# Patient Record
Sex: FEMALE | Race: WHITE | NOT HISPANIC OR LATINO | URBAN - METROPOLITAN AREA
[De-identification: names, ages, dates, MRNs, and addresses within clinical notes are randomized per-mention and may not be internally consistent; named-entity substitution may affect disease eponyms.]

---

## 2021-05-03 ENCOUNTER — OFFICE VISIT CONVERTED (OUTPATIENT)
Dept: ORTHOPEDIC SURGERY | Facility: CLINIC | Age: 46
End: 2021-05-03
Attending: STUDENT IN AN ORGANIZED HEALTH CARE EDUCATION/TRAINING PROGRAM

## 2021-05-14 VITALS — WEIGHT: 192.31 LBS | OXYGEN SATURATION: 97 % | HEART RATE: 98 BPM | HEIGHT: 67 IN | BODY MASS INDEX: 30.18 KG/M2

## 2023-06-16 PROBLEM — L03.90 CELLULITIS: Status: ACTIVE | Noted: 2021-06-02

## 2023-06-16 PROBLEM — R89.5 ABNORMAL MICROBIOLOGICAL FINDINGS IN SPECIMENS FROM OTHER ORGANS, SYSTEMS AND TISSUES: Status: ACTIVE | Noted: 2021-06-02

## 2023-06-16 PROBLEM — E55.9 VITAMIN D DEFICIENCY: Status: ACTIVE | Noted: 2023-06-16

## 2023-06-16 PROBLEM — F17.200 CURRENT EVERY DAY SMOKER: Status: ACTIVE | Noted: 2023-06-16

## 2023-06-16 PROBLEM — G40.909 SEIZURE DISORDER: Status: ACTIVE | Noted: 2023-06-16

## 2023-06-16 PROBLEM — L02.419 ABSCESS OF HIP: Status: ACTIVE | Noted: 2020-10-11

## 2023-06-16 PROBLEM — F32.A ANXIETY AND DEPRESSION: Status: ACTIVE | Noted: 2023-06-16

## 2023-06-16 PROBLEM — F41.9 ANXIETY AND DEPRESSION: Status: ACTIVE | Noted: 2023-06-16

## 2023-06-16 PROBLEM — E11.9 DIABETES MELLITUS: Status: ACTIVE | Noted: 2021-06-02

## 2023-06-16 PROBLEM — IMO0002 ULCERATIVE LESION: Status: ACTIVE | Noted: 2023-06-16

## 2023-06-19 ENCOUNTER — TELEPHONE (OUTPATIENT)
Dept: INTERNAL MEDICINE | Facility: CLINIC | Age: 48
End: 2023-06-19
Payer: MEDICAID

## 2023-07-11 PROBLEM — I10 ESSENTIAL HYPERTENSION: Status: ACTIVE | Noted: 2023-07-11

## 2023-07-11 PROBLEM — E53.8 B12 DEFICIENCY: Status: ACTIVE | Noted: 2023-07-11

## 2023-08-03 ENCOUNTER — TELEPHONE (OUTPATIENT)
Dept: OBSTETRICS AND GYNECOLOGY | Facility: CLINIC | Age: 48
End: 2023-08-03
Payer: MEDICAID

## 2023-08-18 ENCOUNTER — TELEPHONE (OUTPATIENT)
Dept: OBSTETRICS AND GYNECOLOGY | Facility: CLINIC | Age: 48
End: 2023-08-18
Payer: MEDICAID

## 2024-02-05 ENCOUNTER — OFFICE VISIT (OUTPATIENT)
Dept: INTERNAL MEDICINE | Facility: CLINIC | Age: 49
End: 2024-02-05
Payer: MEDICAID

## 2024-02-05 VITALS
WEIGHT: 196 LBS | OXYGEN SATURATION: 98 % | HEIGHT: 67 IN | DIASTOLIC BLOOD PRESSURE: 90 MMHG | TEMPERATURE: 97.3 F | SYSTOLIC BLOOD PRESSURE: 150 MMHG | BODY MASS INDEX: 30.76 KG/M2 | HEART RATE: 109 BPM

## 2024-02-05 DIAGNOSIS — E55.9 VITAMIN D DEFICIENCY: ICD-10-CM

## 2024-02-05 DIAGNOSIS — E08.65 DIABETES MELLITUS DUE TO UNDERLYING CONDITION WITH HYPERGLYCEMIA, UNSPECIFIED WHETHER LONG TERM INSULIN USE: Chronic | ICD-10-CM

## 2024-02-05 DIAGNOSIS — E53.8 FOLATE DEFICIENCY: ICD-10-CM

## 2024-02-05 DIAGNOSIS — E78.5 HYPERLIPIDEMIA, UNSPECIFIED HYPERLIPIDEMIA TYPE: ICD-10-CM

## 2024-02-05 DIAGNOSIS — F17.200 CURRENT EVERY DAY SMOKER: ICD-10-CM

## 2024-02-05 DIAGNOSIS — I10 ESSENTIAL HYPERTENSION: Chronic | ICD-10-CM

## 2024-02-05 DIAGNOSIS — E53.8 B12 DEFICIENCY: ICD-10-CM

## 2024-02-05 DIAGNOSIS — G62.9 PERIPHERAL POLYNEUROPATHY: ICD-10-CM

## 2024-02-05 DIAGNOSIS — F32.A ANXIETY AND DEPRESSION: ICD-10-CM

## 2024-02-05 DIAGNOSIS — Z12.11 ENCOUNTER FOR SCREENING FOR MALIGNANT NEOPLASM OF COLON: ICD-10-CM

## 2024-02-05 DIAGNOSIS — Z00.00 ANNUAL PHYSICAL EXAM: Primary | ICD-10-CM

## 2024-02-05 DIAGNOSIS — F41.9 ANXIETY AND DEPRESSION: ICD-10-CM

## 2024-02-05 DIAGNOSIS — Z23 NEED FOR VACCINATION FOR STREP PNEUMONIAE: ICD-10-CM

## 2024-02-05 PROCEDURE — 99396 PREV VISIT EST AGE 40-64: CPT | Performed by: NURSE PRACTITIONER

## 2024-02-05 PROCEDURE — 90677 PCV20 VACCINE IM: CPT | Performed by: NURSE PRACTITIONER

## 2024-02-05 PROCEDURE — 1160F RVW MEDS BY RX/DR IN RCRD: CPT | Performed by: NURSE PRACTITIONER

## 2024-02-05 PROCEDURE — 3077F SYST BP >= 140 MM HG: CPT | Performed by: NURSE PRACTITIONER

## 2024-02-05 PROCEDURE — 3080F DIAST BP >= 90 MM HG: CPT | Performed by: NURSE PRACTITIONER

## 2024-02-05 PROCEDURE — 1159F MED LIST DOCD IN RCRD: CPT | Performed by: NURSE PRACTITIONER

## 2024-02-05 PROCEDURE — 90471 IMMUNIZATION ADMIN: CPT | Performed by: NURSE PRACTITIONER

## 2024-02-05 RX ORDER — FLUOXETINE HYDROCHLORIDE 40 MG/1
40 CAPSULE ORAL DAILY
Qty: 90 CAPSULE | Refills: 1 | Status: SHIPPED | OUTPATIENT
Start: 2024-02-05

## 2024-02-05 RX ORDER — ROSUVASTATIN CALCIUM 5 MG/1
5 TABLET, COATED ORAL DAILY
Qty: 90 TABLET | Refills: 1 | Status: SHIPPED | OUTPATIENT
Start: 2024-02-05

## 2024-02-05 RX ORDER — PEN NEEDLE, DIABETIC 30 GX3/16"
1 NEEDLE, DISPOSABLE MISCELLANEOUS DAILY
Qty: 100 EACH | Refills: 11 | Status: SHIPPED | OUTPATIENT
Start: 2024-02-05 | End: 2024-05-05

## 2024-02-05 RX ORDER — BACLOFEN 20 MG/1
20 TABLET ORAL 3 TIMES DAILY
Qty: 90 TABLET | Refills: 0 | Status: SHIPPED | OUTPATIENT
Start: 2024-02-05

## 2024-02-05 NOTE — PROGRESS NOTES
Answers submitted by the patient for this visit:  Primary Reason for Visit (Submitted on 2/4/2024)  What is the primary reason for your visit?: Diabetes  Diabetes Questionnaire (Submitted on 2/4/2024)  Chief Complaint: Diabetes problem  Diabetes type: type 2  MedicAlert ID: No  Disease duration: 11 Years  Treatment compliance: some of the time  Home blood tests: 1-2 x per week  High score: 180-200  Below 70: never  blurred vision: Yes  foot paresthesias: Yes  foot ulcerations: No  polydipsia: Yes  polyuria: No  weight loss: No  blackouts: No  hospitalization: No  nocturnal hypoglycemia: No  required assistance: No  required glucagon: No  confusion: No  headaches: No  speech difficulty: No  sweats: No  tremors: No  Meal planning: avoidance of concentrated sweets  Exercise: never  Eye exam current: No  Sees podiatrist: No  Additional information: Needing to try another medicine to get A1C down.  Chief Complaint  Diabetes (3 month follow up. Labs done. The patient states that she was taking ozempic but stopped due to vomiting. The patients sugar and A1c are up. Would liek to discuss pain management. )  Subjective    History of Present Illness  Sole Anaya is a 48 y.o. female who presents to [facility] for Her annual physical exam and follow-up diabetes, hypertension, anxiety, depression, folate deficiency, vitamin B-12 deficiency and vitamin D deficiency. She could not tolerate Ozempic do to vomiting. Blood sugars have [id number]. She needs  total right hip replacement but must have blood sugar controled and stop smoking.  Patient is also complaining of peripheral neuropathy in all 4 extremities.  She states she has been to pain management in the past and used gabapentin before.  She has pain she management in [facility] and was told that she could not get gabapentin if she is taking THC.  She is also requesting muscle relaxer refill that she uses for her hip.      Current  Outpatient Medications:     albuterol sulfate  (90 Base) MCG/ACT inhaler, Inhale 2 puffs 2 (Two) Times a Day. Use with space chamber., Disp: 18 g, Rfl: 5    baclofen (LIORESAL) 20 MG tablet, Take 1 tablet by mouth 3 (Three) Times a Day., Disp: 90 tablet, Rfl: 0    Fluticasone-Salmeterol (ADVAIR/WIXELA) 250-50 MCG/ACT DISKUS, Inhale 1 puff 2 (Two) Times a Day., Disp: 60 each, Rfl: 5    folic acid (FOLVITE) 1 MG tablet, Take 1 tablet by mouth Daily., Disp: 90 tablet, Rfl: 3    losartan (Cozaar) 25 MG tablet, Take 1 tablet by mouth Daily., Disp: 90 tablet, Rfl: 3    metFORMIN (FORTAMET) 1000 MG (OSM) 24 hr tablet, Take 1 tablet by mouth 2 (Two) Times a Day With Meals., Disp: 180 tablet, Rfl: 3    ondansetron ODT (ZOFRAN-ODT) 4 MG disintegrating tablet, Place 1 tablet on the tongue Every 8 (Eight) Hours As Needed for Nausea or Vomiting., Disp: 30 tablet, Rfl: 5    Spacer/Aero-Holding Chambers (Compact Space Chamber) device, 1 applicator Daily., Disp: 1 each, Rfl: 1    vitamin D3 125 MCG (5000 UT) capsule capsule, Take  by mouth., Disp: , Rfl:     FLUoxetine (PROzac) 40 MG capsule, Take 1 capsule by mouth Daily., Disp: 90 capsule, Rfl: 1    Insulin Glargine (LANTUS SOLOSTAR) 100 UNIT/ML injection pen, Inject 10 Units under the skin into the appropriate area as directed Every Night., Disp: 3 mL, Rfl: 5    Insulin Pen Needle (Pen Needles) 31G X 5 MM misc, Use 1 each Daily for 90 days., Disp: 100 each, Rfl: 11    rosuvastatin (Crestor) 5 MG tablet, Take 1 tablet by mouth Daily., Disp: 90 tablet, Rfl: 1  Allergies   Allergen Reactions    Latex Other (See Comments)    Adhesive Tape Itching and Rash      Past Medical History:   Diagnosis Date    Anxiety     B12 deficiency 07/11/2023    Depression     Diabetes mellitus     Essential hypertension 07/11/2023    Hypertension     PTSD (post-traumatic stress disorder)     Vitamin D deficiency 06/16/2023      Past Surgical History:   Procedure Laterality Date    HIP SURGERY    "   HIP SURGERY Left     HYSTERECTOMY      2013        Objective   /90 (BP Location: Right arm, Patient Position: Sitting, Cuff Size: Adult)   Pulse 109   Temp 97.3 °F (36.3 °C) (Temporal)   Ht 170.2 cm (67\")   Wt 88.9 kg (196 lb)   SpO2 98%   BMI 30.70 kg/m²    Estimated body mass index is 30.7 kg/m² as calculated from the following:    Height as of this encounter: 170.2 cm (67\").    Weight as of this encounter: 88.9 kg (196 lb).     Physical Exam  Vitals reviewed.   Constitutional:       General: She is not in acute distress.  HENT:      Head: Normocephalic and atraumatic.      Right Ear: Tympanic membrane and ear canal normal.      Left Ear: Tympanic membrane and ear canal normal.   Eyes:      Conjunctiva/sclera: Conjunctivae normal.   Cardiovascular:      Rate and Rhythm: Normal rate and regular rhythm.      Heart sounds: Normal heart sounds. No murmur heard.  Pulmonary:      Effort: Pulmonary effort is normal.      Breath sounds: Normal breath sounds. No wheezing, rhonchi or rales.   Abdominal:      General: There is no distension.      Palpations: Abdomen is soft. There is no mass.      Tenderness: There is no abdominal tenderness.   Musculoskeletal:      Right lower leg: No edema.      Left lower leg: No edema.   Lymphadenopathy:      Cervical: No cervical adenopathy.   Skin:     General: Skin is warm and dry.      Coloration: Skin is not jaundiced or pale.   Neurological:      General: No focal deficit present.      Mental Status: She is alert.   Psychiatric:         Mood and Affect: Mood normal.         Thought Content: Thought content normal.          Result Review :                  Assessment and Plan   Diagnoses and all orders for this visit:    1. Annual physical exam (Primary)    2. Diabetes mellitus due to underlying condition with hyperglycemia, unspecified whether long term insulin use  -     Hemoglobin A1c; Future  -     Ambulatory Referral to Podiatry    3. Essential hypertension  -    "  Comprehensive Metabolic Panel; Future  -     CBC & Differential; Future  -     T4, Free; Future  -     TSH; Future    4. Anxiety and depression    5. Hyperlipidemia, unspecified hyperlipidemia type  -     Lipid Panel; Future    6. Folate deficiency    7. B12 deficiency    8. Vitamin D deficiency    9. Current every day smoker    10. Peripheral polyneuropathy  -     Ambulatory Referral to Pain Management    11. Need for vaccination for Strep pneumoniae  -     Pneumococcal Conjugate Vaccine 20-Valent All    12. Encounter for screening for malignant neoplasm of colon  -     Ambulatory Referral For Screening Colonoscopy    Other orders  -     baclofen (LIORESAL) 20 MG tablet; Take 1 tablet by mouth 3 (Three) Times a Day.  Dispense: 90 tablet; Refill: 0  -     rosuvastatin (Crestor) 5 MG tablet; Take 1 tablet by mouth Daily.  Dispense: 90 tablet; Refill: 1  -     FLUoxetine (PROzac) 40 MG capsule; Take 1 capsule by mouth Daily.  Dispense: 90 capsule; Refill: 1  -     Insulin Glargine (LANTUS SOLOSTAR) 100 UNIT/ML injection pen; Inject 10 Units under the skin into the appropriate area as directed Every Night.  Dispense: 3 mL; Refill: 5  -     Insulin Pen Needle (Pen Needles) 31G X 5 MM misc; Use 1 each Daily for 90 days.  Dispense: 100 each; Refill: 11      Annual exam: Discussed healthy diet, exercise, adequate sleep, cancer screening, immunizations and preventative care. Annual eye exam and twice yearly dental cleaning.     Diabetes: Uncontrolled.  HA1C 10.4.  Continue Metformin 1000 mg twice a day. Start Lantus insulin 10 units daily and check FBS daily.      Hypertension: Blood pressure controlled on losartan 25 mg daily.     Anxiety and depression: Improving on prozac 20 mg daily. Increase to 40 mg daily.    Hyperlipidemia: . Start Crestor 5 mg daily. Recheck in 3 months.      Folate deficiency: Normal. Monitor.     B-12 deficiency:  Normal. Monitor.     Vitamin D deficiency: Level normal. Monitor.       Current smoker: Advised of ill effects of smoking on health. She is trying to quit.     Peripheral neuropathy: The patient has been to pain management in the past and used gabapentin.  Referral placed.    Patient was given instructions and counseling regarding her condition or for health maintenance advice. Please see specific information pulled into the AVS if appropriate.     Follow Up   Return in about 3 months (around 5/5/2024) for Recheck.    Dictated Utilizing Dragon Dictation.  Please note that portions of this note were completed with a voice recognition program.  Part of this note may be an electronic transcription/translation of spoken language to printed text using the Dragon Dictation System.    Afia Fletcher, MADHURI

## 2024-02-13 ENCOUNTER — TELEPHONE (OUTPATIENT)
Dept: INTERNAL MEDICINE | Facility: CLINIC | Age: 49
End: 2024-02-13
Payer: MEDICAID

## 2024-02-13 DIAGNOSIS — G40.909 UNCLASSIFIED EPILEPTIC SEIZURES: Primary | ICD-10-CM

## 2024-02-13 DIAGNOSIS — G40.909 SEIZURE DISORDER: ICD-10-CM

## 2024-03-11 ENCOUNTER — OFFICE VISIT (OUTPATIENT)
Dept: PODIATRY | Facility: CLINIC | Age: 49
End: 2024-03-11
Payer: MEDICAID

## 2024-03-11 VITALS
SYSTOLIC BLOOD PRESSURE: 135 MMHG | BODY MASS INDEX: 30.76 KG/M2 | HEIGHT: 67 IN | HEART RATE: 109 BPM | WEIGHT: 196 LBS | OXYGEN SATURATION: 96 % | DIASTOLIC BLOOD PRESSURE: 78 MMHG | TEMPERATURE: 97.3 F

## 2024-03-11 DIAGNOSIS — Z79.4 TYPE 2 DIABETES MELLITUS WITH DIABETIC POLYNEUROPATHY, WITH LONG-TERM CURRENT USE OF INSULIN: Primary | ICD-10-CM

## 2024-03-11 DIAGNOSIS — E11.8 DM FEET: ICD-10-CM

## 2024-03-11 DIAGNOSIS — E11.42 TYPE 2 DIABETES MELLITUS WITH DIABETIC POLYNEUROPATHY, WITH LONG-TERM CURRENT USE OF INSULIN: Primary | ICD-10-CM

## 2024-03-11 DIAGNOSIS — G62.9 NEUROPATHY: ICD-10-CM

## 2024-03-11 PROCEDURE — 99243 OFF/OP CNSLTJ NEW/EST LOW 30: CPT | Performed by: PODIATRIST

## 2024-03-11 PROCEDURE — 1160F RVW MEDS BY RX/DR IN RCRD: CPT | Performed by: PODIATRIST

## 2024-03-11 PROCEDURE — G8404 LOW EXTEMITY NEUR EXAM DOCUM: HCPCS | Performed by: PODIATRIST

## 2024-03-11 PROCEDURE — 3078F DIAST BP <80 MM HG: CPT | Performed by: PODIATRIST

## 2024-03-11 PROCEDURE — 1159F MED LIST DOCD IN RCRD: CPT | Performed by: PODIATRIST

## 2024-03-11 PROCEDURE — 3075F SYST BP GE 130 - 139MM HG: CPT | Performed by: PODIATRIST

## 2024-03-11 NOTE — LETTER
March 11, 2024       No Recipients    Patient: Sole Anaya   YOB: 1975   Date of Visit: 3/11/2024       Dear MADHURI Caal:    Thank you for referring Sole Anaya to me for evaluation. Below are the relevant portions of my assessment and plan of care.    Encounter Diagnosis and Orders:  Diagnoses and all orders for this visit:    1. Type 2 diabetes mellitus with diabetic polyneuropathy, with long-term current use of insulin (Primary)    2. Neuropathy    3. DM feet        If you have questions, please do not hesitate to call me. I look forward to following Sole along with you.         Sincerely,        Pankaj Jackman DPM        CC:   No Recipients

## 2024-03-11 NOTE — PROGRESS NOTES
[facility] - PODIATRY    Today's Date: 03/11/24    Patient Name: Sole Anaya  MRN: [id number]  CSN: [id number]  PCP: Afia Fletcher APRN, Last PCP Visit: 5 February 2024  Referring Provider: Afia Fletcher APRN    SUBJECTIVE     Chief Complaint   Patient presents with    Left Foot - Establish Care, Annual Exam, Diabetes    Right Foot - Establish Care, Annual Exam, Diabetes     HPI: Sole Anaya, a 48 y.o.female, presents to clinic for a diabetic foot evaluation.    New, Established, New Problem: New    Onset: Insidious    Nature: IDDM with neuropathy    Patient controlling diabetes via:  insulin injections    Patient states there last blood glucose was:  152    Patient denies any fevers, chills, nausea, vomiting, shortness of breath, nor any other constitutional signs nor symptoms.    No other pedal complaints at this time.    Patient states she is try to get her blood sugars and hemoglobin A1c is improved to get her right hip fixed.  Patient states she is decreasing her smoking intake and hopefully will be stopped soon.    Past Medical History:   Diagnosis Date    Anxiety     B12 deficiency 07/11/2023    Depression     Diabetes mellitus     Essential hypertension 07/11/2023    Hypertension     PTSD (post-traumatic stress disorder)     Vitamin D deficiency 06/16/2023     Past Surgical History:   Procedure Laterality Date    HIP SURGERY      HIP SURGERY Left     HYSTERECTOMY      2013     History reviewed. No pertinent family history.  Social History     Socioeconomic History    Marital status:    Tobacco Use    Smoking status: Every Day     Current packs/day: 0.25     Types: Cigarettes    Smokeless tobacco: Current   Vaping Use    Vaping status: Never Used   Substance and Sexual Activity    Alcohol use: Never    Drug use: Yes     Types: Marijuana    Sexual activity: Defer     Allergies   Allergen Reactions    Latex Other (See Comments)    Adhesive Tape Itching and Rash     Current  Outpatient Medications   Medication Sig Dispense Refill    albuterol sulfate  (90 Base) MCG/ACT inhaler Inhale 2 puffs 2 (Two) Times a Day. Use with space chamber. 18 g 5    baclofen (LIORESAL) 20 MG tablet Take 1 tablet by mouth 3 (Three) Times a Day. 90 tablet 0    FLUoxetine (PROzac) 40 MG capsule Take 1 capsule by mouth Daily. 90 capsule 1    Fluticasone-Salmeterol (ADVAIR/WIXELA) 250-50 MCG/ACT DISKUS Inhale 1 puff 2 (Two) Times a Day. 60 each 5    folic acid (FOLVITE) 1 MG tablet Take 1 tablet by mouth Daily. 90 tablet 3    Insulin Glargine (LANTUS SOLOSTAR) 100 UNIT/ML injection pen Inject 10 Units under the skin into the appropriate area as directed Every Night. 3 mL 5    Insulin Pen Needle (Pen Needles) 31G X 5 MM misc Use 1 each Daily for 90 days. 100 each 11    losartan (Cozaar) 25 MG tablet Take 1 tablet by mouth Daily. 90 tablet 3    metFORMIN (FORTAMET) 1000 MG (OSM) 24 hr tablet Take 1 tablet by mouth 2 (Two) Times a Day With Meals. 180 tablet 3    ondansetron ODT (ZOFRAN-ODT) 4 MG disintegrating tablet Place 1 tablet on the tongue Every 8 (Eight) Hours As Needed for Nausea or Vomiting. 30 tablet 5    rosuvastatin (Crestor) 5 MG tablet Take 1 tablet by mouth Daily. 90 tablet 1    Spacer/Aero-Holding Chambers (Compact Space Chamber) device 1 applicator Daily. 1 each 1    vitamin D3 125 MCG (5000 UT) capsule capsule Take  by mouth.       No current facility-administered medications for this visit.     Review of Systems   Constitutional: Negative.    Neurological:  Positive for numbness.   All other systems reviewed and are negative.      OBJECTIVE     Vitals:    03/11/24 0802   BP: 135/78   Pulse: 109   Temp: 97.3 °F (36.3 °C)   SpO2: 96%       Body mass index is 30.7 kg/m².    Lab Results   Component Value Date    HGBA1C 9.8 (H) 10/12/2020       Lab Results   Component Value Date    CALCIUM 10.0 08/30/2021     08/30/2021    K 3.8 08/30/2021    CO2 25 08/30/2021     08/30/2021    BUN  10 08/30/2021    CREATININE 0.80 08/30/2021    EGFRIFAFRI >60 08/30/2021    BCR 12.5 08/30/2021    ANIONGAP 15 (H) 08/30/2021       Patient seen in no apparent distress.      PHYSICAL EXAM:     Foot/Ankle Exam    GENERAL  Diabetic foot exam performed    Appearance:  appears stated age  Orientation:  AAOx3  Affect:  appropriate  Gait:  unimpaired  Assistance:  independent  Right shoe gear: casual shoe  Left shoe gear: casual shoe    VASCULAR     Right Foot Vascularity   Dorsalis pedis:  1+  Posterior tibial:  1+  Skin temperature:  warm  Edema grading:  None  CFT:  < 3 seconds  Pedal hair growth:  Present  Varicosities:  mild varicosities     Left Foot Vascularity   Dorsalis pedis:  1+  Posterior tibial:  1+  Skin temperature:  warm  Edema grading:  None  CFT:  < 3 seconds  Pedal hair growth:  Absent  Varicosities:  mild varicosities     NEUROLOGIC     Right Foot Neurologic   Light touch sensation: absent  Vibratory sensation: absent  Hot/Cold sensation: absent  Protective Sensation using [place] Monofilament:   Sites tested: 10     Left Foot Neurologic   Light touch sensation: absent  Vibratory sensation: absent  Hot/Cold sensation:  absent  Protective Sensation using [place] Monofilament:   Sites tested: 10    MUSCLE STRENGTH     Right Foot Muscle Strength   Foot dorsiflexion:  4-  Foot plantar flexion:  4-  Foot inversion:  4-  Foot eversion:  4-     Left Foot Muscle Strength   Foot dorsiflexion:  4-  Foot plantar flexion:  4-  Foot inversion:  4-  Foot eversion:  4-    RANGE OF MOTION     Right Foot Range of Motion   Foot and ankle ROM within normal limits       Left Foot Range of Motion   Foot and ankle ROM within normal limits      DERMATOLOGIC      Right Foot Dermatologic   Skin  Right foot skin is intact.      Left Foot Dermatologic   Skin  Left foot skin is intact.     Diabetic Foot Exam Performed and Monofilament Test Performed    ASSESSMENT/PLAN     Diagnoses and all orders for this  visit:    1. Type 2 diabetes mellitus with diabetic polyneuropathy, with long-term current use of insulin (Primary)    2. Neuropathy    3. DM feet        Comprehensive lower extremity examination and evaluation was performed.    Discussed findings and treatment plan including risks, benefits, and treatment options with patient in detail. Patient agreed with treatment plan.    Medications and allergies reviewed.  Reviewed available blood glucose and HgB A1C lab values along with other pertinent labs.  These were discussed with the patient as to their importance of diabetic maintenance.    Advised smoking cessation and discussed techniques to quit (patch, pill, etc), (3 - 10 minutes).  I encouraged the patient to discuss steps to begin process with their primary care provider.    Diabetic foot exam performed and documented this date, compliant with CQM required standards. Detail of findings as noted in physical exam.  Lower extremity Neurologic exam for diabetic patient performed and documented this date, compliant with PQRS required standards. Detail of findings as noted in physical exam.  Advised patient importance of good routine lower extremity hygiene. Advised patient importance of evaluating for intact skin and pain free nail borders.  Advised patient to use mirror to evaluate plantar/ soles of feet for better visualization. Advised patient monitor and phone office to be seen if any cracking to skin, open lesions, painful nail borders or if nails become elongated prior to next visit. Advised patient importance of daily cleansing of lower extremities, followed by good skin cream to maintain normal hydration of skin. Also advised patient importance of close daily monitoring of blood sugar. Advised to regulate diet and medications to maintain control of blood sugar in optimal range. Contact primary care provider if difficulties maintaining blood sugar levels.  Advised Patient of presence of Diabetes Mellitus  condition.  Advised Patient risk of progression and worsening or improvement, then return of condition.  Will monitor condition for any change in future. Treat with most appropriate treatment pending status of condition.  Counseled and advised patient extensively on nature and ramifications of diabetes. Standard instructions given to patient for good diabetic foot care and maintenance. Advised importance of careful monitoring to avoid break down and complications secondary to diabetes. Advised patient importance of strict maintenance of blood sugar control. Advised patient of possible ominous results from neglect of condition, i.e.: amputation/ loss of digits, feet and legs, or even death.  Patient states understands counseling, will monitor closely, continue good hygiene and routine diabetic foot care. Patient will contact office is questions or problems.      An After Visit Summary was printed and given to the patient at discharge, including (if requested) any available informative/educational handouts regarding diagnosis, treatment, or medications. All questions were answered to patient/family satisfaction. Should symptoms fail to improve or worsen they agree to call or return to clinic or to go to the Emergency Department. Discussed the importance of following up with any needed screening tests/labs/specialist appointments and any requested follow-up recommended by me today. Importance of maintaining follow-up discussed and patient accepts that missed appointments can delay diagnosis and potentially lead to worsening of conditions.    Return in about 1 year (around 3/11/2025) for DFE., or sooner if acute issues arise.    This document has been electronically signed by Pankaj Jackman DPM on March 11, 2024 08:43 EDT

## 2024-03-12 ENCOUNTER — PATIENT ROUNDING (BHMG ONLY) (OUTPATIENT)
Dept: PODIATRY | Facility: CLINIC | Age: 49
End: 2024-03-12
Payer: MEDICAID

## 2024-03-12 NOTE — PROGRESS NOTES
A [employer] message has been sent to the patient for PATIENT ROUNDING with [facility]

## 2024-03-29 ENCOUNTER — TELEPHONE (OUTPATIENT)
Dept: INTERNAL MEDICINE | Age: 49
End: 2024-03-29
Payer: MEDICAID

## 2024-03-29 NOTE — TELEPHONE ENCOUNTER
Pharmacy Name:  [facility]    Pharmacy representative name: ZEINAB    Pharmacy representative phone number: [phone]     What medication are you calling in regards to: SELENA    What question does the pharmacy have: THEY NEED AN UPDATED PRESCRIPTION WITH THE PATIENT'S NEW DOSAGE    Who is the provider that prescribed the medication: VISH HUDSON    Additional notes: PHARMACIST STATES THAT THE PATIENT TOLD THEM THAT SHE IS TAKING MUCH MORE THAN 10 UNITS PER NIGHT AND IS ALREADY RUNNING OUT OF MEDICATION. PHARMACIST STATES THAT THEY HAVE THE DOSAGE AS 10 UNITS PER NIGHT SO IT IS TOO SOON TO FILL THE CURRENT DOSAGE THROUGH THE PATIENT'S INSURANCE.

## 2024-03-29 NOTE — TELEPHONE ENCOUNTER
"Tried calling patient, unable to leave voicemail. At patients last visit, it is documented she is taking 10 units of lantus daily. If she is taking \"way more\" then she weill need to come in for an appointment to discuss options with Afia.      HUB MAY RELAY MESSAGE.   "

## 2024-03-29 NOTE — TELEPHONE ENCOUNTER
"    Caller: Sole Anaya \"Salma\"    Relationship: Self    Best call back number: [phone]    Requested Prescriptions:   Requested Prescriptions     Pending Prescriptions Disp Refills    Insulin Glargine (LANTUS SOLOSTAR) 100 UNIT/ML injection pen 3 mL 5     Sig: Inject 10 Units under the skin into the appropriate area as directed Every Night.        Pharmacy where request should be sent: [address] [phone] [place] [phone]      Last office visit with prescribing clinician: 2/5/2024   Last telemedicine visit with prescribing clinician: Visit date not found   Next office visit with prescribing clinician: 5/6/2024     Additional details provided by patient: OUT OF MEDICATION     Does the patient have less than a 3 day supply:  [x] Yes  [] No    Would you like a call back once the refill request has been completed: [x] Yes [] No    If the office needs to give you a call back, can they leave a voicemail: [x] Yes [] No    Joan Evans Rep   03/29/24 10:24 EDT           "

## 2024-04-05 ENCOUNTER — TELEPHONE (OUTPATIENT)
Dept: INTERNAL MEDICINE | Age: 49
End: 2024-04-05

## 2024-05-05 PROBLEM — E53.8 FOLATE DEFICIENCY: Status: ACTIVE | Noted: 2024-05-05

## 2024-05-05 PROBLEM — E78.5 HYPERLIPIDEMIA: Status: ACTIVE | Noted: 2024-05-05

## 2024-05-06 ENCOUNTER — OFFICE VISIT (OUTPATIENT)
Dept: INTERNAL MEDICINE | Age: 49
End: 2024-05-06
Payer: MEDICAID

## 2024-05-06 VITALS
SYSTOLIC BLOOD PRESSURE: 139 MMHG | DIASTOLIC BLOOD PRESSURE: 80 MMHG | HEART RATE: 105 BPM | TEMPERATURE: 96.4 F | HEIGHT: 67 IN | WEIGHT: 202.2 LBS | OXYGEN SATURATION: 95 % | BODY MASS INDEX: 31.74 KG/M2

## 2024-05-06 DIAGNOSIS — L98.9 SKIN LESIONS: ICD-10-CM

## 2024-05-06 DIAGNOSIS — I10 ESSENTIAL HYPERTENSION: ICD-10-CM

## 2024-05-06 DIAGNOSIS — E08.65 DIABETES MELLITUS DUE TO UNDERLYING CONDITION WITH HYPERGLYCEMIA, UNSPECIFIED WHETHER LONG TERM INSULIN USE: Chronic | ICD-10-CM

## 2024-05-06 DIAGNOSIS — E55.9 VITAMIN D DEFICIENCY: ICD-10-CM

## 2024-05-06 DIAGNOSIS — E53.8 FOLATE DEFICIENCY: ICD-10-CM

## 2024-05-06 DIAGNOSIS — F41.9 ANXIETY AND DEPRESSION: ICD-10-CM

## 2024-05-06 DIAGNOSIS — E78.5 HYPERLIPIDEMIA, UNSPECIFIED HYPERLIPIDEMIA TYPE: ICD-10-CM

## 2024-05-06 DIAGNOSIS — E10.65 UNCONTROLLED TYPE 1 DIABETES MELLITUS WITH HYPERGLYCEMIA: Primary | ICD-10-CM

## 2024-05-06 DIAGNOSIS — F32.A ANXIETY AND DEPRESSION: ICD-10-CM

## 2024-05-06 DIAGNOSIS — R53.83 FATIGUE, UNSPECIFIED TYPE: ICD-10-CM

## 2024-05-06 DIAGNOSIS — Z11.59 NEED FOR HEPATITIS C SCREENING TEST: ICD-10-CM

## 2024-05-06 DIAGNOSIS — E53.8 B12 DEFICIENCY: ICD-10-CM

## 2024-05-06 PROCEDURE — 1160F RVW MEDS BY RX/DR IN RCRD: CPT | Performed by: NURSE PRACTITIONER

## 2024-05-06 PROCEDURE — 1159F MED LIST DOCD IN RCRD: CPT | Performed by: NURSE PRACTITIONER

## 2024-05-06 PROCEDURE — 99214 OFFICE O/P EST MOD 30 MIN: CPT | Performed by: NURSE PRACTITIONER

## 2024-05-06 PROCEDURE — 3079F DIAST BP 80-89 MM HG: CPT | Performed by: NURSE PRACTITIONER

## 2024-05-06 PROCEDURE — 3075F SYST BP GE 130 - 139MM HG: CPT | Performed by: NURSE PRACTITIONER

## 2024-05-06 RX ORDER — NICOTINE 21 MG/24HR
1 PATCH, TRANSDERMAL 24 HOURS TRANSDERMAL EVERY 24 HOURS
Qty: 14 PATCH | Refills: 0 | Status: SHIPPED | OUTPATIENT
Start: 2024-05-06 | End: 2024-05-20

## 2024-05-06 RX ORDER — DESVENLAFAXINE SUCCINATE 50 MG/1
50 TABLET, EXTENDED RELEASE ORAL DAILY
Qty: 90 TABLET | Refills: 0 | Status: SHIPPED | OUTPATIENT
Start: 2024-05-06

## 2024-05-10 ENCOUNTER — PATIENT ROUNDING (BHMG ONLY) (OUTPATIENT)
Dept: DIABETES SERVICES | Facility: CLINIC | Age: 49
End: 2024-05-10

## 2024-05-10 ENCOUNTER — OFFICE VISIT (OUTPATIENT)
Dept: DIABETES SERVICES | Facility: CLINIC | Age: 49
End: 2024-05-10
Payer: MEDICAID

## 2024-05-10 VITALS
WEIGHT: 204 LBS | OXYGEN SATURATION: 98 % | HEIGHT: 67 IN | SYSTOLIC BLOOD PRESSURE: 128 MMHG | DIASTOLIC BLOOD PRESSURE: 83 MMHG | HEART RATE: 116 BPM | BODY MASS INDEX: 32.02 KG/M2

## 2024-05-10 DIAGNOSIS — E11.40 TYPE 2 DIABETES MELLITUS WITH DIABETIC NEUROPATHY, WITH LONG-TERM CURRENT USE OF INSULIN: ICD-10-CM

## 2024-05-10 DIAGNOSIS — E66.9 OBESITY (BMI 30-39.9): ICD-10-CM

## 2024-05-10 DIAGNOSIS — E11.65 UNCONTROLLED TYPE 2 DIABETES MELLITUS WITH HYPERGLYCEMIA: Primary | ICD-10-CM

## 2024-05-10 DIAGNOSIS — Z79.4 TYPE 2 DIABETES MELLITUS WITH DIABETIC NEUROPATHY, WITH LONG-TERM CURRENT USE OF INSULIN: ICD-10-CM

## 2024-05-10 PROCEDURE — 3079F DIAST BP 80-89 MM HG: CPT | Performed by: NURSE PRACTITIONER

## 2024-05-10 PROCEDURE — 1159F MED LIST DOCD IN RCRD: CPT | Performed by: NURSE PRACTITIONER

## 2024-05-10 PROCEDURE — 99204 OFFICE O/P NEW MOD 45 MIN: CPT | Performed by: NURSE PRACTITIONER

## 2024-05-10 PROCEDURE — 1160F RVW MEDS BY RX/DR IN RCRD: CPT | Performed by: NURSE PRACTITIONER

## 2024-05-10 PROCEDURE — 3074F SYST BP LT 130 MM HG: CPT | Performed by: NURSE PRACTITIONER

## 2024-05-10 RX ORDER — ACYCLOVIR 400 MG/1
1 TABLET ORAL ONCE
Qty: 1 EACH | Refills: 0 | Status: SHIPPED | OUTPATIENT
Start: 2024-05-10 | End: 2024-05-10

## 2024-05-10 RX ORDER — PEN NEEDLE, DIABETIC 30 GX3/16"
1 NEEDLE, DISPOSABLE MISCELLANEOUS
Qty: 450 EACH | Refills: 1 | Status: SHIPPED | OUTPATIENT
Start: 2024-05-10 | End: 2024-11-06

## 2024-05-10 RX ORDER — ACYCLOVIR 400 MG/1
1 TABLET ORAL
Qty: 3 EACH | Refills: 5 | Status: SHIPPED | OUTPATIENT
Start: 2024-05-10

## 2024-05-10 RX ORDER — INSULIN LISPRO 100 [IU]/ML
20 INJECTION, SOLUTION INTRAVENOUS; SUBCUTANEOUS
Qty: 54 ML | Refills: 1 | Status: SHIPPED | OUTPATIENT
Start: 2024-05-10 | End: 2024-11-06

## 2024-05-10 RX ORDER — MULTIVIT WITH MINERALS/LUTEIN
250 TABLET ORAL DAILY
COMMUNITY

## 2024-05-10 NOTE — PATIENT INSTRUCTIONS
Change your Lantus to taking 25 units twice a day approximately 12 hours apart    Continue taking your metformin as previously prescribed    Begin taking Humalog insulin as follows before each meal    5 units for “small meal” (30 - 45 grams of carbohydrate)        (may take ½ dose if eating less than 30 grams)  7 units for “medium meal” (45 - 60 grams of carbohydrate)  9 units for “large meal” (60 - 90 grams of carbohydrate)     Add to the meal dose using the following scale when needed:    If blood glucose is less than 150 mg/dl - 0 units  If blood glucose is between 151 and 175 - 1 units  If blood glucose is between 176 and 200 - 2 units  If blood glucose is between 201 and 225 - 3 units  If blood glucose is between 226 and 250 - 4 units  If blood glucose is between 251 and 275 - 5 units  If blood glucose is between 276 and 300 - 6 units  If blood glucose is between 301 and 325 - 7 units  If blood glucose is between 326 and 350 - 8 units  If blood glucose is 351 or above - 9 units

## 2024-05-14 ENCOUNTER — TELEPHONE (OUTPATIENT)
Dept: INTERNAL MEDICINE | Age: 49
End: 2024-05-14

## 2024-06-06 RX ORDER — BACLOFEN 20 MG/1
20 TABLET ORAL 3 TIMES DAILY
Qty: 90 TABLET | Refills: 0 | Status: SHIPPED | OUTPATIENT
Start: 2024-06-06

## 2024-06-12 ENCOUNTER — TELEPHONE (OUTPATIENT)
Dept: INTERNAL MEDICINE | Age: 49
End: 2024-06-12

## 2024-06-12 NOTE — TELEPHONE ENCOUNTER
Caller: ARIZONA FROM [facility]    Relationship: Other    Best call back number: [phone]     What form or medical record are you requesting: MOST RECENT VISIT CHART NOTES    Who is requesting this form or medical record from you: [facility] - CURRENT REFERRAL    How would you like to receive the form or medical records (pick-up, mail, fax): FAX  If fax, what is the fax number: [phone]    Timeframe paperwork needed: AS SOON AS POSSIBLE.    Additional notes: CALLER STATES THAT THEY HAVE NOT RECEIVED FAXED DOCUMENTS FROM 06.04.2024. CALLER WOULD LIKE TO HAVE DOCUMENTS FAXED AGAIN. PLEASE ADVISE.

## 2024-06-24 ENCOUNTER — TELEPHONE (OUTPATIENT)
Dept: DIABETES SERVICES | Facility: HOSPITAL | Age: 49
End: 2024-06-24
Payer: MEDICAID

## 2024-06-24 NOTE — TELEPHONE ENCOUNTER
Patient called to get scheduled with Diabetes Educator for pump start, she was wondering if she needed insulin prescribed to her for the pump before the appointment on 7/17.

## 2024-06-30 NOTE — PROGRESS NOTES
Chief Complaint  Diabetes (Follow up, med mgt, A1c eval, cgm eval, started on dexcom, will start pump later this month )    Referred By: MADHURI Caal presents to [facility] for diabetes medication management    History of Present Illness    Visit type:  follow-up  Diabetes type:  Type 2  Current diabetes status/concerns/issues:  An insulin pump was ordered for the patient but it has not yet been started.  The patient is scheduled in the upcoming month.  Other health concerns:  She continues to have a lot of right hip pain  Current Diabetes symptoms:    Polyuria: No   Polydipsia: Yes     Polyphagia: No   Blurred vision: No   Excessive fatigue: Yes she stays tired; sleeps a lot  Known Diabetes complications:  Neuropathy: Tingling, Pain, Burning, and Shooting Pain; Location: Feet and Hands  Renal: None  Eyes: No current eye exam available in record; Location: N/A; Last Eye Exam:  2 years ago ; Location: [facility] first  Amputation/Wounds:  She has prior history of a wound to the right foot which is completely healed as well and has extensive wound to the posterior left thigh which is now healed; no current wounds  GI: Indigestion and Nausea  Cardiovascular: Hypertension and Hyperlipidemia  ED: N/A  Other: None  Hypoglycemia:  None reported at this time  Hypoglycemia Symptoms:  No hypoglycemia at this time  Current diabetes treatment:  Lantus 25 units twice a day, metformin 1000 mg twice a day.  She was started on Humalog taking 5, 7, or 9 units before meals based on small, medium, or large amounts of carbohydrates plus correction for glucose levels greater than 150 using a 1-25 correction ratio at the last appointment.  She is using around 6-7 units with most meals.  Blood glucose device:  Dexcom CGM  Blood glucose monitoring frequency:  Continuous per CGM  Blood glucose range/average:  The 14-day sensor report shows an  average glucose of 271 mg/dL, with 4% in target range ( mgdL), 37% in the high range (181-250 mg/dL), 59% in the very high range (>250 mg/dL), 0% in the low range (54-70 mg/dL) and 0% in the very low range (<54 mg/dL).   Glucose Source: Device Reviewed  Diet:   She is cutting back on the sugar in her Jaquan-Aid.  Activity/Exercise:   Limited due to her hip issues    Past Medical History:   Diagnosis Date    Anxiety     B12 deficiency 07/11/2023    Depression     Diabetes mellitus     Essential hypertension 07/11/2023    Folate deficiency 05/05/2024    Hyperlipidemia 05/05/2024    PTSD (post-traumatic stress disorder)     Vitamin D deficiency 06/16/2023     Past Surgical History:   Procedure Laterality Date    HIP SURGERY      HIP SURGERY Left     HYSTERECTOMY      2013     Family History   Problem Relation Age of Onset    Diabetes Paternal Aunt     Diabetes Cousin     Diabetes Maternal Aunt      Social History     Socioeconomic History    Marital status:    Tobacco Use    Smoking status: Every Day     Current packs/day: 0.25     Types: Cigarettes    Smokeless tobacco: Current   Vaping Use    Vaping status: Never Used   Substance and Sexual Activity    Alcohol use: Never    Drug use: Yes     Types: Marijuana    Sexual activity: Defer     Allergies   Allergen Reactions    Latex Other (See Comments)    Adhesive Tape Itching and Rash       Current Outpatient Medications:     albuterol sulfate  (90 Base) MCG/ACT inhaler, Inhale 2 puffs 2 (Two) Times a Day. Use with space chamber., Disp: 18 g, Rfl: 5    baclofen (LIORESAL) 20 MG tablet, Take 1 tablet by mouth 3 (Three) Times a Day., Disp: 90 tablet, Rfl: 0    BLACK COHOSH PO, Take  by mouth., Disp: , Rfl:     Continuous Glucose Sensor (Dexcom G7 Sensor) misc, Use 1 each Every 10 (Ten) Days., Disp: 3 each, Rfl: 5    desvenlafaxine (Pristiq) 50 MG 24 hr tablet, Take 1 tablet by mouth Daily., Disp: 90 tablet, Rfl: 0    Fluticasone-Salmeterol (ADVAIR/WIXELA)  "250-50 MCG/ACT DISKUS, Inhale 1 puff 2 (Two) Times a Day., Disp: 60 each, Rfl: 5    folic acid (FOLVITE) 1 MG tablet, Take 1 tablet by mouth Daily., Disp: 90 tablet, Rfl: 3    Insulin Glargine (LANTUS SOLOSTAR) 100 UNIT/ML injection pen, Inject 35 Units under the skin into the appropriate area as directed Every Night for 180 days. Take 25 units of Lantus twice a day approximately 12 hours apart; increase the dose as directed to a maximum dose of 35 units twice a day, Disp: 32 mL, Rfl: 1    Insulin Lispro, 1 Unit Dial, (HumaLOG KwikPen) 100 UNIT/ML solution pen-injector, Inject 20 Units under the skin into the appropriate area as directed 3 (Three) Times a Day Before Meals for 180 days. Adjust dose as directed based on meal size and glucose level up to 20 units 3 times a day, Disp: 54 mL, Rfl: 1    Insulin Pen Needle (Pen Needles) 32G X 4 MM misc, Use 1 each 5 (Five) Times a Day for 180 days., Disp: 450 each, Rfl: 1    losartan (Cozaar) 25 MG tablet, Take 1 tablet by mouth Daily., Disp: 90 tablet, Rfl: 3    metFORMIN (FORTAMET) 1000 MG (OSM) 24 hr tablet, Take 1 tablet by mouth 2 (Two) Times a Day With Meals., Disp: 180 tablet, Rfl: 3    nicotine (NICODERM CQ) 7 MG/24HR patch, Place 1 patch on the skin as directed by provider Daily., Disp: 30 patch, Rfl: 1    ondansetron ODT (ZOFRAN-ODT) 4 MG disintegrating tablet, Place 1 tablet on the tongue Every 8 (Eight) Hours As Needed for Nausea or Vomiting., Disp: 30 tablet, Rfl: 5    rosuvastatin (Crestor) 5 MG tablet, Take 1 tablet by mouth Daily., Disp: 90 tablet, Rfl: 1    Spacer/Aero-Holding Chambers (Compact Space Chamber) device, 1 applicator Daily., Disp: 1 each, Rfl: 1    vitamin C (ASCORBIC ACID) 250 MG tablet, Take 1 tablet by mouth Daily., Disp: , Rfl:     vitamin D3 125 MCG (5000 UT) capsule capsule, Take  by mouth., Disp: , Rfl:     Objective     Vitals:    07/01/24 1305   BP: 170/81   Pulse: 114   SpO2: 96%   Weight: 93.4 kg (206 lb)   Height: 170.2 cm (67\") "   PainSc: 10-Worst pain ever     Body mass index is 32.26 kg/m².    Physical Exam  Constitutional:       Appearance: Normal appearance. She is obese.   HENT:      Head: Normocephalic and atraumatic.      Right Ear: External ear normal.      Left Ear: External ear normal.      Nose: Nose normal.   Eyes:      Extraocular Movements: Extraocular movements intact.      Conjunctiva/sclera: Conjunctivae normal.   Pulmonary:      Effort: Pulmonary effort is normal.   Musculoskeletal:         General: Normal range of motion.      Cervical back: Normal range of motion.   Skin:     General: Skin is warm and dry.   Neurological:      General: No focal deficit present.      Mental Status: She is alert and oriented to person, place, and time. Mental status is at baseline.   Psychiatric:         Mood and Affect: Mood normal.         Behavior: Behavior normal.         Thought Content: Thought content normal.         Judgment: Judgment normal.             Result Review :   The following data was reviewed by: MADHURI Mansfield on 07/01/2024:    Most Recent A1C          7/1/2024    13:11   HGBA1C Most Recent   Hemoglobin A1C 10.5        A1C Last 3 Results          7/1/2024    13:11   HGBA1C Last 3 Results   Hemoglobin A1C 10.5      A1c collected in the office today is 10.5%, indicating Uncontrolled Type II diabetes.  This result is up from the prior result of 10% collected on 5/8/2024    Glucose   Date Value Ref Range Status   07/01/2024 292 (H) 70 - 99 mg/dL Final     Comment:     Serial Number: [id number]:  [id number]   10/19/2020 223 (H) 74 - 99 mg/dL Final     Point of care glucose in the office today is  elevated at 292 mg/dL              Assessment: The patient has had no improvement in her A1c since the prior evaluation.  She has started on her continuous glucose sensor but has yet to be started on the insulin pump.  She is scheduled for that later this month.  In discussion with the patient it was determined that  she has not been using the carb dose plus correction when taking her mealtime insulin.  She did not understand that she was to combine the 2 doses and has only been using the sliding scale.      Diagnoses and all orders for this visit:    1. Uncontrolled type 2 diabetes mellitus with hyperglycemia (Primary)  -     POC Glycosylated Hemoglobin (Hb A1C)    2. Type 2 diabetes mellitus with diabetic neuropathy, with long-term current use of insulin    3. Uses self-applied continuous glucose monitoring device    4. Obesity (BMI 30-39.9)    Other orders  -     POC Glucose        Plan: We will have the patient increase his Lantus to taking 30 units twice a day.  She was advised if her glucose levels remain consistently above 200 after 1 week then increase to 35 twice a day and remain on this dose until her pump started.  She is to focus on taking both the mealtime insulin and the correction dose at each meal.  No other changes will be made since the patient will be starting on her insulin pump very soon.    The patient will monitor her blood glucose levels using the continuous glucose sensor.  If she develops problematic hyperglycemia or hypoglycemia or adverse drug reactions, she will contact the office for further instructions.        Follow Up     Return in about 6 weeks (around 8/12/2024) for Pump Eval, CGM Follow-up.    Patient was given instructions and counseling regarding her condition or for health maintenance advice. Please see specific information pulled into the AVS if appropriate.     Jen Guthrie, MADHURI  07/01/2024      Dictated Utilizing Dragon Dictation.  Please note that portions of this note were completed with a voice recognition program.  Part of this note may be an electronic transcription/translation of spoken language to printed text using the Dragon Dictation System.

## 2024-07-01 ENCOUNTER — OFFICE VISIT (OUTPATIENT)
Dept: DIABETES SERVICES | Facility: HOSPITAL | Age: 49
End: 2024-07-01
Payer: MEDICAID

## 2024-07-01 VITALS
SYSTOLIC BLOOD PRESSURE: 170 MMHG | WEIGHT: 206 LBS | OXYGEN SATURATION: 96 % | HEIGHT: 67 IN | BODY MASS INDEX: 32.33 KG/M2 | HEART RATE: 114 BPM | DIASTOLIC BLOOD PRESSURE: 81 MMHG

## 2024-07-01 DIAGNOSIS — E66.9 OBESITY (BMI 30-39.9): ICD-10-CM

## 2024-07-01 DIAGNOSIS — Z79.4 TYPE 2 DIABETES MELLITUS WITH DIABETIC NEUROPATHY, WITH LONG-TERM CURRENT USE OF INSULIN: ICD-10-CM

## 2024-07-01 DIAGNOSIS — E11.40 TYPE 2 DIABETES MELLITUS WITH DIABETIC NEUROPATHY, WITH LONG-TERM CURRENT USE OF INSULIN: ICD-10-CM

## 2024-07-01 DIAGNOSIS — E11.65 UNCONTROLLED TYPE 2 DIABETES MELLITUS WITH HYPERGLYCEMIA: Primary | ICD-10-CM

## 2024-07-01 DIAGNOSIS — Z97.8 USES SELF-APPLIED CONTINUOUS GLUCOSE MONITORING DEVICE: ICD-10-CM

## 2024-07-01 LAB
EXPIRATION DATE: ABNORMAL
GLUCOSE BLDC GLUCOMTR-MCNC: 292 MG/DL (ref 70–99)
HBA1C MFR BLD: 10.5 % (ref 4.5–5.7)
Lab: ABNORMAL

## 2024-07-01 PROCEDURE — G0463 HOSPITAL OUTPT CLINIC VISIT: HCPCS | Performed by: NURSE PRACTITIONER

## 2024-07-01 PROCEDURE — 3077F SYST BP >= 140 MM HG: CPT | Performed by: NURSE PRACTITIONER

## 2024-07-01 PROCEDURE — 83036 HEMOGLOBIN GLYCOSYLATED A1C: CPT | Performed by: NURSE PRACTITIONER

## 2024-07-01 PROCEDURE — 3046F HEMOGLOBIN A1C LEVEL >9.0%: CPT | Performed by: NURSE PRACTITIONER

## 2024-07-01 PROCEDURE — 1159F MED LIST DOCD IN RCRD: CPT | Performed by: NURSE PRACTITIONER

## 2024-07-01 PROCEDURE — 82948 REAGENT STRIP/BLOOD GLUCOSE: CPT | Performed by: NURSE PRACTITIONER

## 2024-07-01 PROCEDURE — 99214 OFFICE O/P EST MOD 30 MIN: CPT | Performed by: NURSE PRACTITIONER

## 2024-07-01 PROCEDURE — 1160F RVW MEDS BY RX/DR IN RCRD: CPT | Performed by: NURSE PRACTITIONER

## 2024-07-01 PROCEDURE — 3079F DIAST BP 80-89 MM HG: CPT | Performed by: NURSE PRACTITIONER

## 2024-07-01 PROCEDURE — 95251 CONT GLUC MNTR ANALYSIS I&R: CPT | Performed by: NURSE PRACTITIONER

## 2024-07-01 NOTE — PATIENT INSTRUCTIONS
Increase your Lantus this to 30 units twice a day.  Monitor your glucose levels with your Dexcom for period of 1 week and if your sugars are staying consistently above 200 then go to the 35 units twice a day and remain on this dose until you start your pump    Remember to take your mealtime dose of insulin based on the amount of carbohydrates you are going to eat plus adding the scale based on what your glucose is before you eat

## 2024-07-17 ENCOUNTER — EDUCATION (OUTPATIENT)
Dept: DIABETES SERVICES | Facility: HOSPITAL | Age: 49
End: 2024-07-17
Payer: MEDICAID

## 2024-07-17 DIAGNOSIS — E11.8 TYPE 2 DIABETES MELLITUS WITH UNSPECIFIED COMPLICATIONS: Primary | ICD-10-CM

## 2024-07-23 ENCOUNTER — EDUCATION (OUTPATIENT)
Dept: DIABETES SERVICES | Facility: HOSPITAL | Age: 49
End: 2024-07-23
Payer: MEDICAID

## 2024-07-23 DIAGNOSIS — E11.8 TYPE 2 DIABETES MELLITUS WITH UNSPECIFIED COMPLICATIONS: Primary | ICD-10-CM

## 2024-07-23 NOTE — PROGRESS NOTES
Patient returned for insulin pump download and review.I reviewed the pump report with patient and mother.  I answered questions regarding changing sites and cartridges.  I explained to never refill cartridges.

## 2024-07-25 NOTE — PROGRESS NOTES
Sole Anaya 49 y.o. presents for insulin pump instructions. Insulin pump training check list completed.  Patient stated they have viewed some of the insulin pump videos.  Patient was able to maneuver pump screens without difficulty.  Patient applied insulin pump insertion site without difficulty 15-15 rule for treatment of low blood glucose was reviewed with patient. Patient was explained to always have back up supplies including blood glucose meter, strips, lancets, basal insulin, short acting insulin pens or vials with insulin syringes and emergency glucagon. Patient was given DSME staff contact information and encouraged patient to contact staff with questions or concerns.  Insulin pump settings are as follows:  12 AM-12 AM basal rate 1.6 units/h, insulin carb ratio 1 unit per 6 g of carbs, insulin sensitivity 1 unit per 23 points above target, manual blood glucose target 125, control IQ target 110, active insulin 5 hours, maximum bolus 25 units, and basal rate 3 units/h.  Please see insulin pump documents under media tab.    Time started: 8: 05    Time ended: 10: 05    Veronique Andrade, RNC-AWJOHNNY, MLDE, [facility]  07/17/2024

## 2024-08-14 NOTE — PROGRESS NOTES
Chief Complaint  Diabetes    Referred By: MADHURI Caal presents to [facility] for insulin pump management    History of Present Illness    Visit type:  follow-up  Diabetes type:  Type 2  Current diabetes status/concerns/issues:  The patient was trained and started on the tandem insulin pump on 7/17/2024.  This is her initial evaluation.  Other health concerns: No new health concerns  Current Diabetes symptoms:    Polyuria: No   Polydipsia: No   Polyphagia: No   Blurred vision: No   Excessive fatigue: No  Known Diabetes complications:  Neuropathy: Tingling, Pain, Burning, and Shooting Pain; Location: Feet and Hands  Renal: No current urine microalbumin available and Normal eGFR per current labs  Eyes: No current eye exam available in record; Location: N/A; Last Eye Exam:  2 years ago ; Location: [facility] first  Amputation/Wounds:  She has prior history of a wound to the right foot which is completely healed as well and has extensive wound to the posterior left thigh which is now healed; no current wounds  GI: Indigestion and Nausea  Cardiovascular: Hypertension and Hyperlipidemia  ED: N/A  Other: None  Hypoglycemia:  None reported at this time  Hypoglycemia Symptoms:  No hypoglycemia at this time  Current diabetes treatment:  Tandem insulin pump using Humalog insulin.  She is using around 90 units each day  Blood glucose device:  Dexcom CGM  Blood glucose monitoring frequency:  Continuous per CGM  Blood glucose range/average:  The 14-day sensor report shows an average glucose of 171 mg/dL, with 63% in target range ( mgdL), 3.% in the high range (181-250 mg/dL), 7.2% in the very high range (>250 mg/dL), 0% in the low range (54-70 mg/dL) and 0% in the very low range (<54 mg/dL).   Glucose Source: Device Reviewed  Diet:  Carbohydrate Counting, Limits high carb/sweet foods, Avoids sugary drinks  Activity/Exercise:   None    Past Medical History:   Diagnosis Date    Anxiety     B12 deficiency 07/11/2023    Depression     Diabetes mellitus     Essential hypertension 07/11/2023    Folate deficiency 05/05/2024    Hyperlipidemia 05/05/2024    PTSD (post-traumatic stress disorder)     Vitamin D deficiency 06/16/2023     Past Surgical History:   Procedure Laterality Date    HIP SURGERY      HIP SURGERY Left     HYSTERECTOMY      2013     Family History   Problem Relation Age of Onset    Diabetes Paternal Aunt     Diabetes Cousin     Diabetes Maternal Aunt      Social History     Socioeconomic History    Marital status:    Tobacco Use    Smoking status: Every Day     Current packs/day: 0.25     Types: Cigarettes    Smokeless tobacco: Current   Vaping Use    Vaping status: Never Used   Substance and Sexual Activity    Alcohol use: Never    Drug use: Yes     Types: Marijuana    Sexual activity: Defer     Allergies   Allergen Reactions    Latex Other (See Comments)    Adhesive Tape Itching and Rash       Current Outpatient Medications:     albuterol sulfate  (90 Base) MCG/ACT inhaler, Inhale 2 puffs 2 (Two) Times a Day. Use with space chamber., Disp: 18 g, Rfl: 5    baclofen (LIORESAL) 20 MG tablet, Take 1 tablet by mouth 3 (Three) Times a Day., Disp: 90 tablet, Rfl: 0    BLACK COHOSH PO, Take  by mouth., Disp: , Rfl:     Continuous Glucose Sensor (Dexcom G7 Sensor) misc, Use 1 each Every 10 (Ten) Days., Disp: 3 each, Rfl: 5    desvenlafaxine (Pristiq) 50 MG 24 hr tablet, Take 1 tablet by mouth Daily., Disp: 90 tablet, Rfl: 0    Fluticasone-Salmeterol (ADVAIR/WIXELA) 250-50 MCG/ACT DISKUS, Inhale 1 puff 2 (Two) Times a Day., Disp: 60 each, Rfl: 5    folic acid (FOLVITE) 1 MG tablet, Take 1 tablet by mouth Daily., Disp: 90 tablet, Rfl: 3    losartan (Cozaar) 25 MG tablet, Take 1 tablet by mouth Daily., Disp: 90 tablet, Rfl: 3    metFORMIN (FORTAMET) 1000 MG (OSM) 24 hr tablet, Take 1 tablet by mouth 2 (Two) Times a Day With  "Meals., Disp: 180 tablet, Rfl: 3    nicotine (NICODERM CQ) 7 MG/24HR patch, Place 1 patch on the skin as directed by provider Daily., Disp: 30 patch, Rfl: 1    ondansetron ODT (ZOFRAN-ODT) 4 MG disintegrating tablet, Place 1 tablet on the tongue Every 8 (Eight) Hours As Needed for Nausea or Vomiting., Disp: 30 tablet, Rfl: 5    rosuvastatin (Crestor) 5 MG tablet, Take 1 tablet by mouth Daily., Disp: 90 tablet, Rfl: 1    Spacer/Aero-Holding Chambers (Compact Space Chamber) device, 1 applicator Daily., Disp: 1 each, Rfl: 1    vitamin C (ASCORBIC ACID) 250 MG tablet, Take 1 tablet by mouth Daily., Disp: , Rfl:     vitamin D3 125 MCG (5000 UT) capsule capsule, Take  by mouth., Disp: , Rfl:     Insulin Lispro (HumaLOG) 100 UNIT/ML injection, Up to max daily dose of 130 units per day per insulin pump, Disp: 120 mL, Rfl: 1    Objective     Vitals:    08/15/24 0941   BP: 156/80   BP Location: Left arm   Patient Position: Sitting   Cuff Size: Adult   Pulse: 107   Temp: 98 °F (36.7 °C)   SpO2: 95%   Weight: 96.3 kg (212 lb 6.4 oz)   Height: 170.2 cm (67\")     Body mass index is 33.27 kg/m².    Physical Exam  Constitutional:       Appearance: Normal appearance. She is obese.      Comments: Obesity (BMI 30 - 39.9) Pt Current BMI = 33.27    HENT:      Head: Normocephalic and atraumatic.      Right Ear: External ear normal.      Left Ear: External ear normal.      Nose: Nose normal.   Eyes:      Extraocular Movements: Extraocular movements intact.      Conjunctiva/sclera: Conjunctivae normal.   Pulmonary:      Effort: Pulmonary effort is normal.   Musculoskeletal:         General: Normal range of motion.      Cervical back: Normal range of motion.   Skin:     General: Skin is warm and dry.   Neurological:      General: No focal deficit present.      Mental Status: She is alert and oriented to person, place, and time. Mental status is at baseline.   Psychiatric:         Mood and Affect: Mood normal.         Behavior: Behavior " normal.         Thought Content: Thought content normal.         Judgment: Judgment normal.             Result Review :   The following data was reviewed by: MADHURI Mansfield on 08/15/2024:    Most Recent A1C          8/15/2024    09:47   HGBA1C Most Recent   Hemoglobin A1C 8.3        A1C Last 3 Results          7/1/2024    13:11 8/15/2024    09:47   HGBA1C Last 3 Results   Hemoglobin A1C 10.5  8.3      A1c collected in the office today is 8.3%, indicating Uncontrolled Type II diabetes.  This result is down from the prior result of 10.5% collected on 7/1/24     Glucose   Date Value Ref Range Status   08/15/2024 181 (H) 70 - 99 mg/dL Final     Comment:     Serial Number: [id number]:  [id number]     Point of care glucose in the office today is  elevated at 181 mg/dL              Assessment: Patient has had improvement in her A1c already.  She has recently been started on the tandem insulin pump with CGM.  She denies having any questions or concerns about use of the device.  It was noted that when reviewing the settings on the pump report that the patient had the sleep activity set from 12 PM to 9 AM but it should have been set at 12 AM to 9 AM.  The patient does have erratic sleep patterns but she indicates that 12 AM to 9 AM should be cover most of her routine sleep cycle.      Diagnoses and all orders for this visit:    1. Uncontrolled type 2 diabetes mellitus with hyperglycemia (Primary)  -     Microalbumin / Creatinine Urine Ratio - Urine, Clean Catch; Future  -     POC Glucose  -     POC Glycosylated Hemoglobin (Hb A1C)  -     Insulin Lispro (HumaLOG) 100 UNIT/ML injection; Up to max daily dose of 130 units per day per insulin pump  Dispense: 120 mL; Refill: 1  -     Microalbumin / Creatinine Urine Ratio - Urine, Clean Catch    2. Type 2 diabetes mellitus with diabetic neuropathy, with long-term current use of insulin    3. Insulin pump in place    4. Insulin pump titration    5. Uses self-applied  continuous glucose monitoring device    6. Obesity (BMI 30-39.9)        Plan: The sleep activity was reset to the appropriate time between 12 AM and 9 AM.  This should allow for better correction of hyperglycemia and improvement in her glucose control and A1c.  No other changes were made to the settings today.  The patient will focus on compliance with the use of the pump as well as her dietary strategies for further glucose control.    The patient will monitor her blood glucose levels using her CGM.  If she develops problematic hyperglycemia or hypoglycemia or adverse drug reactions, she will contact the office for further instructions.        Follow Up     Return in about 2 months (around 10/15/2024) for Pump Eval, CGM Follow-up.    Patient was given instructions and counseling regarding her condition or for health maintenance advice. Please see specific information pulled into the AVS if appropriate.     Jen Guthrie, MADHURI  08/15/2024      Dictated Utilizing Dragon Dictation.  Please note that portions of this note were completed with a voice recognition program.  Part of this note may be an electronic transcription/translation of spoken language to printed text using the Dragon Dictation System.

## 2024-08-15 ENCOUNTER — OFFICE VISIT (OUTPATIENT)
Dept: DIABETES SERVICES | Facility: HOSPITAL | Age: 49
End: 2024-08-15
Payer: MEDICAID

## 2024-08-15 VITALS
WEIGHT: 212.4 LBS | SYSTOLIC BLOOD PRESSURE: 156 MMHG | BODY MASS INDEX: 33.34 KG/M2 | TEMPERATURE: 98 F | DIASTOLIC BLOOD PRESSURE: 80 MMHG | HEIGHT: 67 IN | OXYGEN SATURATION: 95 % | HEART RATE: 107 BPM

## 2024-08-15 DIAGNOSIS — E11.40 TYPE 2 DIABETES MELLITUS WITH DIABETIC NEUROPATHY, WITH LONG-TERM CURRENT USE OF INSULIN: ICD-10-CM

## 2024-08-15 DIAGNOSIS — Z96.41 INSULIN PUMP IN PLACE: ICD-10-CM

## 2024-08-15 DIAGNOSIS — Z97.8 USES SELF-APPLIED CONTINUOUS GLUCOSE MONITORING DEVICE: ICD-10-CM

## 2024-08-15 DIAGNOSIS — E11.65 UNCONTROLLED TYPE 2 DIABETES MELLITUS WITH HYPERGLYCEMIA: Primary | ICD-10-CM

## 2024-08-15 DIAGNOSIS — E66.9 OBESITY (BMI 30-39.9): ICD-10-CM

## 2024-08-15 DIAGNOSIS — Z79.4 TYPE 2 DIABETES MELLITUS WITH DIABETIC NEUROPATHY, WITH LONG-TERM CURRENT USE OF INSULIN: ICD-10-CM

## 2024-08-15 DIAGNOSIS — Z46.81 INSULIN PUMP TITRATION: ICD-10-CM

## 2024-08-15 LAB
ALBUMIN UR-MCNC: 1.8 MG/DL
CREAT UR-MCNC: 198.7 MG/DL
EXPIRATION DATE: ABNORMAL
GLUCOSE BLDC GLUCOMTR-MCNC: 181 MG/DL (ref 70–99)
HBA1C MFR BLD: 8.3 % (ref 4.5–5.7)
Lab: ABNORMAL
MICROALBUMIN/CREAT UR: 9.1 MG/G (ref 0–29)

## 2024-08-15 PROCEDURE — G0463 HOSPITAL OUTPT CLINIC VISIT: HCPCS | Performed by: NURSE PRACTITIONER

## 2024-08-15 PROCEDURE — 3079F DIAST BP 80-89 MM HG: CPT | Performed by: NURSE PRACTITIONER

## 2024-08-15 PROCEDURE — 1159F MED LIST DOCD IN RCRD: CPT | Performed by: NURSE PRACTITIONER

## 2024-08-15 PROCEDURE — 3052F HG A1C>EQUAL 8.0%<EQUAL 9.0%: CPT | Performed by: NURSE PRACTITIONER

## 2024-08-15 PROCEDURE — 82043 UR ALBUMIN QUANTITATIVE: CPT | Performed by: NURSE PRACTITIONER

## 2024-08-15 PROCEDURE — 99214 OFFICE O/P EST MOD 30 MIN: CPT | Performed by: NURSE PRACTITIONER

## 2024-08-15 PROCEDURE — 1160F RVW MEDS BY RX/DR IN RCRD: CPT | Performed by: NURSE PRACTITIONER

## 2024-08-15 PROCEDURE — 3077F SYST BP >= 140 MM HG: CPT | Performed by: NURSE PRACTITIONER

## 2024-08-15 PROCEDURE — 95251 CONT GLUC MNTR ANALYSIS I&R: CPT | Performed by: NURSE PRACTITIONER

## 2024-08-15 PROCEDURE — 83036 HEMOGLOBIN GLYCOSYLATED A1C: CPT | Performed by: NURSE PRACTITIONER

## 2024-08-15 PROCEDURE — 82570 ASSAY OF URINE CREATININE: CPT | Performed by: NURSE PRACTITIONER

## 2024-08-15 PROCEDURE — 82948 REAGENT STRIP/BLOOD GLUCOSE: CPT | Performed by: NURSE PRACTITIONER

## 2024-08-15 RX ORDER — INSULIN LISPRO 100 [IU]/ML
INJECTION, SOLUTION INTRAVENOUS; SUBCUTANEOUS
Qty: 120 ML | Refills: 1 | Status: SHIPPED | OUTPATIENT
Start: 2024-08-15

## 2024-08-26 ENCOUNTER — OFFICE VISIT (OUTPATIENT)
Dept: INTERNAL MEDICINE | Age: 49
End: 2024-08-26
Payer: MEDICAID

## 2024-08-26 VITALS
OXYGEN SATURATION: 96 % | HEIGHT: 67 IN | TEMPERATURE: 97.4 F | DIASTOLIC BLOOD PRESSURE: 81 MMHG | BODY MASS INDEX: 33.27 KG/M2 | SYSTOLIC BLOOD PRESSURE: 120 MMHG | WEIGHT: 212 LBS | HEART RATE: 70 BPM

## 2024-08-26 DIAGNOSIS — E53.8 B12 DEFICIENCY: ICD-10-CM

## 2024-08-26 DIAGNOSIS — E55.9 VITAMIN D DEFICIENCY: ICD-10-CM

## 2024-08-26 DIAGNOSIS — E11.630: ICD-10-CM

## 2024-08-26 DIAGNOSIS — E53.8 FOLATE DEFICIENCY: ICD-10-CM

## 2024-08-26 DIAGNOSIS — E10.43 DIABETIC AUTONOMIC NEUROPATHY ASSOCIATED WITH TYPE 1 DIABETES MELLITUS: ICD-10-CM

## 2024-08-26 DIAGNOSIS — F17.200 CURRENT EVERY DAY SMOKER: ICD-10-CM

## 2024-08-26 DIAGNOSIS — I10 ESSENTIAL HYPERTENSION: Primary | ICD-10-CM

## 2024-08-26 DIAGNOSIS — E78.5 HYPERLIPIDEMIA, UNSPECIFIED HYPERLIPIDEMIA TYPE: ICD-10-CM

## 2024-08-26 DIAGNOSIS — F41.9 ANXIETY AND DEPRESSION: ICD-10-CM

## 2024-08-26 DIAGNOSIS — Z12.31 ENCOUNTER FOR SCREENING MAMMOGRAM FOR MALIGNANT NEOPLASM OF BREAST: ICD-10-CM

## 2024-08-26 DIAGNOSIS — F32.A ANXIETY AND DEPRESSION: ICD-10-CM

## 2024-08-26 PROCEDURE — 3079F DIAST BP 80-89 MM HG: CPT | Performed by: NURSE PRACTITIONER

## 2024-08-26 PROCEDURE — 1125F AMNT PAIN NOTED PAIN PRSNT: CPT | Performed by: NURSE PRACTITIONER

## 2024-08-26 PROCEDURE — 99406 BEHAV CHNG SMOKING 3-10 MIN: CPT | Performed by: NURSE PRACTITIONER

## 2024-08-26 PROCEDURE — 3052F HG A1C>EQUAL 8.0%<EQUAL 9.0%: CPT | Performed by: NURSE PRACTITIONER

## 2024-08-26 PROCEDURE — 1160F RVW MEDS BY RX/DR IN RCRD: CPT | Performed by: NURSE PRACTITIONER

## 2024-08-26 PROCEDURE — 3074F SYST BP LT 130 MM HG: CPT | Performed by: NURSE PRACTITIONER

## 2024-08-26 PROCEDURE — 1159F MED LIST DOCD IN RCRD: CPT | Performed by: NURSE PRACTITIONER

## 2024-08-26 PROCEDURE — 99214 OFFICE O/P EST MOD 30 MIN: CPT | Performed by: NURSE PRACTITIONER

## 2024-08-26 RX ORDER — FOLIC ACID 1 MG/1
1 TABLET ORAL DAILY
Qty: 90 TABLET | Refills: 3 | Status: SHIPPED | OUTPATIENT
Start: 2024-08-26

## 2024-08-26 RX ORDER — DESVENLAFAXINE 50 MG/1
50 TABLET, FILM COATED, EXTENDED RELEASE ORAL DAILY
Qty: 90 TABLET | Refills: 1 | Status: SHIPPED | OUTPATIENT
Start: 2024-08-26

## 2024-08-26 RX ORDER — LOSARTAN POTASSIUM 25 MG/1
25 TABLET ORAL DAILY
Qty: 90 TABLET | Refills: 1 | Status: SHIPPED | OUTPATIENT
Start: 2024-08-26

## 2024-08-26 RX ORDER — AMOXICILLIN 500 MG/1
CAPSULE ORAL
Qty: 11 CAPSULE | Refills: 0 | Status: SHIPPED | OUTPATIENT
Start: 2024-08-26

## 2024-08-26 RX ORDER — ROSUVASTATIN CALCIUM 5 MG/1
5 TABLET, COATED ORAL DAILY
Qty: 90 TABLET | Refills: 1 | Status: SHIPPED | OUTPATIENT
Start: 2024-08-26

## 2024-08-26 NOTE — PROGRESS NOTES
Chief Complaint  Follow-up (The patient is coming in for a 3 month follow up, labs done. The patient states that her right arm has shooting pains and is going numb. She states this wakes her up in the night. She's tried heat and ice. This started 4 weeks ago. The patient would like an A1C done. She states that her tooth has an abscess that started a few days ago. She would like an antibiotic. )  Subjective    History of Present Illness  Sole Anaya is a 49 y.o. female  presents to [facility] for follow-up of follow-up diabetes, hypertension, hyperlipidemia, anxiety, depression, folate deficiency, B-12 deficiency and vitamin D deficiency.     The patient complains that her right arm from hand up to shoulder and has shooting pains and is going numb. This started 4 weeks ago. She states this wakes her up in the night. She's tried heat and ice. She states that her tooth has an abscess that started a few days ago. She would like an antibiotic.     Past Medical History:   Diagnosis Date    Anxiety     B12 deficiency 07/11/2023    Depression     Diabetes mellitus     Essential hypertension 07/11/2023    Folate deficiency 05/05/2024    Hyperlipidemia 05/05/2024    PTSD (post-traumatic stress disorder)     Vitamin D deficiency 06/16/2023        Past Surgical History:   Procedure Laterality Date    HIP SURGERY      HIP SURGERY Left     HYSTERECTOMY      2013        Social History     Tobacco Use   Smoking Status Every Day    Current packs/day: 0.25    Types: Cigarettes   Smokeless Tobacco Current        Patient Care Team:  Afia Fletcher APRN as PCP - General (Nurse Practitioner)  Jen Guthrie APRN as Nurse Practitioner (Endocrinology)    Allergies   Allergen Reactions    Latex Other (See Comments)    Adhesive Tape Itching and Rash          Current Outpatient Medications:     albuterol sulfate  (90 Base) MCG/ACT inhaler, Inhale 2 puffs 2 (Two) Times a Day. Use with  space chamber., Disp: 18 g, Rfl: 5    baclofen (LIORESAL) 20 MG tablet, Take 1 tablet by mouth 3 (Three) Times a Day., Disp: 90 tablet, Rfl: 0    BLACK COHOSH PO, Take  by mouth., Disp: , Rfl:     Continuous Glucose Sensor (Dexcom G7 Sensor) misc, Use 1 each Every 10 (Ten) Days., Disp: 3 each, Rfl: 5    desvenlafaxine (Pristiq) 50 MG 24 hr tablet, Take 1 tablet by mouth Daily., Disp: 90 tablet, Rfl: 1    Fluticasone-Salmeterol (ADVAIR/WIXELA) 250-50 MCG/ACT DISKUS, Inhale 1 puff 2 (Two) Times a Day., Disp: 60 each, Rfl: 5    folic acid (FOLVITE) 1 MG tablet, Take 1 tablet by mouth Daily., Disp: 90 tablet, Rfl: 3    Insulin Lispro (HumaLOG) 100 UNIT/ML injection, Up to max daily dose of 130 units per day per insulin pump, Disp: 120 mL, Rfl: 1    losartan (Cozaar) 25 MG tablet, Take 1 tablet by mouth Daily., Disp: 90 tablet, Rfl: 1    metFORMIN (FORTAMET) 1000 MG (OSM) 24 hr tablet, Take 1 tablet by mouth 2 (Two) Times a Day With Meals., Disp: 180 tablet, Rfl: 3    nicotine (NICODERM CQ) 7 MG/24HR patch, Place 1 patch on the skin as directed by provider Daily., Disp: 30 patch, Rfl: 1    ondansetron ODT (ZOFRAN-ODT) 4 MG disintegrating tablet, Place 1 tablet on the tongue Every 8 (Eight) Hours As Needed for Nausea or Vomiting., Disp: 30 tablet, Rfl: 5    rosuvastatin (Crestor) 5 MG tablet, Take 1 tablet by mouth Daily., Disp: 90 tablet, Rfl: 1    Spacer/Aero-Holding Chambers (Compact Space Chamber) device, 1 applicator Daily., Disp: 1 each, Rfl: 1    vitamin C (ASCORBIC ACID) 250 MG tablet, Take 1 tablet by mouth Daily., Disp: , Rfl:     vitamin D3 125 MCG (5000 UT) capsule capsule, Take  by mouth., Disp: , Rfl:     amoxicillin (AMOXIL) 500 MG capsule, Take 2 capsules x 1 dose and then take 1 capsule 3 times a day for 3 days., Disp: 11 capsule, Rfl: 0    Objective     Vitals:    08/26/24 0940   BP: 120/81   BP Location: Left arm   Patient Position: Sitting   Cuff Size: Large Adult   Pulse: 70   Temp: 97.4 °F (36.3 °C)  "  TempSrc: Temporal   SpO2: 96%   Weight: 96.2 kg (212 lb)   Height: 170.2 cm (67\")        Wt Readings from Last 3 Encounters:   08/26/24 96.2 kg (212 lb)   08/15/24 96.3 kg (212 lb 6.4 oz)   07/01/24 93.4 kg (206 lb)        BP Readings from Last 3 Encounters:   08/26/24 120/81   08/15/24 156/80   07/01/24 170/81        Physical Exam  Vitals reviewed.   Constitutional:       General: She is not in acute distress.  HENT:      Head: Normocephalic and atraumatic.      Mouth/Throat:      Mouth: Mucous membranes are moist.      Dentition: Gingival swelling and dental caries present.   Cardiovascular:      Rate and Rhythm: Normal rate and regular rhythm.   Pulmonary:      Effort: Pulmonary effort is normal.      Breath sounds: Normal breath sounds. No wheezing, rhonchi or rales.   Musculoskeletal:      Right lower leg: No edema.      Left lower leg: No edema.      Comments: Patient sitting in wheelchair.   Lymphadenopathy:      Cervical: No cervical adenopathy.   Skin:     General: Skin is warm and dry.   Neurological:      General: No focal deficit present.      Mental Status: She is alert.   Psychiatric:         Thought Content: Thought content normal.          Result Review   The following data was reviewed by: MADHURI Caal on 08/26/2024:  [x]  Tests & Results  []  Hospitalization/Emergency Department/Urgent Care  [x]  Internal/External Consultant Notes       Assessment and Plan     Diagnoses and all orders for this visit:    1. Essential hypertension (Primary)  -     Comprehensive Metabolic Panel; Future  -     CBC & Differential; Future  -     TSH Rfx On Abnormal To Free T4; Future    2. Diabetic autonomic neuropathy associated with type 1 diabetes mellitus    3. Hyperlipidemia, unspecified hyperlipidemia type  -     Lipid Panel; Future    4. Anxiety and depression    5. Folate deficiency  -     Folate; Future    6. B12 deficiency  -     CBC & Differential; Future  -     Vitamin B12; Future    7. Vitamin D " deficiency  -     Vitamin D,25-Hydroxy; Future    8. Gingivitis with diabetes mellitus    9. Encounter for screening mammogram for malignant neoplasm of breast  -     Mammo Screening Digital Tomosynthesis Bilateral With CAD; Future    10. Current every day smoker    Other orders  -     desvenlafaxine (Pristiq) 50 MG 24 hr tablet; Take 1 tablet by mouth Daily.  Dispense: 90 tablet; Refill: 1  -     folic acid (FOLVITE) 1 MG tablet; Take 1 tablet by mouth Daily.  Dispense: 90 tablet; Refill: 3  -     losartan (Cozaar) 25 MG tablet; Take 1 tablet by mouth Daily.  Dispense: 90 tablet; Refill: 1  -     rosuvastatin (Crestor) 5 MG tablet; Take 1 tablet by mouth Daily.  Dispense: 90 tablet; Refill: 1  -     amoxicillin (AMOXIL) 500 MG capsule; Take 2 capsules x 1 dose and then take 1 capsule 3 times a day for 3 days.  Dispense: 11 capsule; Refill: 0       Hypertension: Blood pressure controlled on losartan 25 mg daily.     Diabetes with neuropathy: The patient has been seen MADHURI Meyers with diabetes management specialty. She is now using insulin pump and CGM.  HA1C is improving.  She will continue to get HA1C's done through diabetes management.  The patient continues to have neuropathy in her lower extremities and now having neuropathy in her right upper extremity.  She does not want to go to pain management.  The patient is scheduled to see neurology 8/29/2024 and I have asked her to discuss her neuropathy issue at that time.     Hyperlipidemia: Continues on Crestor 5 mg daily.      Anxiety and depression: Improved on Pristiq 50 mg daily.     Folate deficiency: Last level normal. Monitor.     B-12 deficiency:  Last level normal. Monitor.     Vitamin D deficiency: Last level normal. Continue supplement. Monitor.    Gingivitis and dental abscess right lower: Start amoxicillin 2 capsules x 1 dose and 3 times a day for 3 days.  Follow-up with dentist.  The importance of this was discussed.        BMI is >= 30 and <35.  (Class 1 Obesity). The following options were offered after discussion;: nutrition counseling/recommendations       Sole Anaya  reports that she has been smoking cigarettes. She uses smokeless tobacco. I have educated her on the risk of diseases from using tobacco products such as cancer, COPD, and heart disease.     I advised her to quit and she is willing to quit. We have discussed the following method/s for tobacco cessation:  Counseling and Prescription Medication.  Together we have set a quit date for 3 weeks from today.  She will follow up with me in 6 month or sooner to check on her progress.    I spent 5 minutes counseling the patient.    Patient was given instructions and counseling regarding her condition or for health maintenance advice. Please see specific information pulled into the AVS if appropriate.     Follow Up   Return in about 6 months (around 2/26/2025) for Annual physical.    Please note that portions of this documentation were completed with a voice recognition program.    MADHURI Caal

## 2024-08-29 ENCOUNTER — OFFICE VISIT (OUTPATIENT)
Dept: NEUROLOGY | Facility: CLINIC | Age: 49
End: 2024-08-29
Payer: MEDICAID

## 2024-08-29 ENCOUNTER — PATIENT ROUNDING (BHMG ONLY) (OUTPATIENT)
Dept: NEUROLOGY | Facility: CLINIC | Age: 49
End: 2024-08-29
Payer: MEDICAID

## 2024-08-29 VITALS
BODY MASS INDEX: 33.29 KG/M2 | SYSTOLIC BLOOD PRESSURE: 160 MMHG | WEIGHT: 212.1 LBS | HEART RATE: 93 BPM | HEIGHT: 67 IN | DIASTOLIC BLOOD PRESSURE: 78 MMHG

## 2024-08-29 DIAGNOSIS — R56.9 SEIZURE-LIKE ACTIVITY: Primary | ICD-10-CM

## 2024-08-29 DIAGNOSIS — R20.2 ARM PARESTHESIA, RIGHT: ICD-10-CM

## 2024-08-29 DIAGNOSIS — F41.9 ANXIETY AND DEPRESSION: ICD-10-CM

## 2024-08-29 DIAGNOSIS — F32.A ANXIETY AND DEPRESSION: ICD-10-CM
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
## 2024-09-06 RX ORDER — BACLOFEN 20 MG/1
20 TABLET ORAL 3 TIMES DAILY
Qty: 90 TABLET | Refills: 0 | Status: SHIPPED | OUTPATIENT
Start: 2024-09-06

## 2024-09-06 NOTE — TELEPHONE ENCOUNTER
"Caller: Sole Anaya \"Salma\"    Relationship: Self    Best call back number:     [phone]     Requested Prescriptions:   Requested Prescriptions     Pending Prescriptions Disp Refills    baclofen (LIORESAL) 20 MG tablet 90 tablet 0     Sig: Take 1 tablet by mouth 3 (Three) Times a Day.        Pharmacy where request should be sent: [address] [phone] [facility] [phone]      Last office visit with prescribing clinician: 8/26/2024   Last telemedicine visit with prescribing clinician: Visit date not found   Next office visit with prescribing clinician: 2/24/2025     Additional details provided by patient: PATIENT STATES THAT SHE IS OUT OF MEDICATION     Does the patient have less than a 3 day supply:  [] Yes  [] No    Would you like a call back once the refill request has been completed: [] Yes [] No    If the office needs to give you a call back, can they leave a voicemail: [] Yes [] No    Joan Bacon Rep   09/06/24 15:39 EDT     "

## 2024-09-16 DIAGNOSIS — E11.65 UNCONTROLLED TYPE 2 DIABETES MELLITUS WITH HYPERGLYCEMIA: ICD-10-CM

## 2024-09-17 ENCOUNTER — HOSPITAL ENCOUNTER (OUTPATIENT)
Facility: HOSPITAL | Age: 49
Discharge: HOME | End: 2024-09-17
Admitting: NURSE PRACTITIONER
Payer: MEDICAID

## 2024-09-17 DIAGNOSIS — R20.2 ARM PARESTHESIA, RIGHT: ICD-10-CM

## 2024-09-17 PROCEDURE — 95909 NRV CNDJ TST 5-6 STUDIES: CPT

## 2024-09-17 PROCEDURE — 95886 MUSC TEST DONE W/N TEST COMP: CPT

## 2024-09-17 RX ORDER — ACYCLOVIR 400 MG/1
1 TABLET ORAL
Qty: 3 EACH | Refills: 5 | Status: SHIPPED | OUTPATIENT
Start: 2024-09-17

## 2024-09-19 ENCOUNTER — HOSPITAL ENCOUNTER (OUTPATIENT)
Dept: MAMMOGRAPHY | Facility: HOSPITAL | Age: 49
Discharge: HOME | End: 2024-09-19
Admitting: NURSE PRACTITIONER
Payer: MEDICAID

## 2024-09-19 ENCOUNTER — PATIENT MESSAGE (OUTPATIENT)
Dept: NEUROLOGY | Facility: CLINIC | Age: 49
End: 2024-09-19
Payer: MEDICAID

## 2024-09-19 DIAGNOSIS — Z12.31 ENCOUNTER FOR SCREENING MAMMOGRAM FOR MALIGNANT NEOPLASM OF BREAST: ICD-10-CM

## 2024-09-19 DIAGNOSIS — G56.01 CARPAL TUNNEL SYNDROME OF RIGHT WRIST: Primary | ICD-10-CM

## 2024-09-19 PROCEDURE — 77063 BREAST TOMOSYNTHESIS BI: CPT

## 2024-09-19 PROCEDURE — 77067 SCR MAMMO BI INCL CAD: CPT

## 2024-10-08 ENCOUNTER — PREP FOR SURGERY (OUTPATIENT)
Dept: OTHER | Facility: HOSPITAL | Age: 49
End: 2024-10-08
Payer: MEDICAID

## 2024-10-08 ENCOUNTER — OFFICE VISIT (OUTPATIENT)
Dept: ORTHOPEDIC SURGERY | Facility: CLINIC | Age: 49
End: 2024-10-08
Payer: MEDICAID

## 2024-10-08 VITALS
HEIGHT: 67 IN | OXYGEN SATURATION: 92 % | DIASTOLIC BLOOD PRESSURE: 83 MMHG | HEART RATE: 96 BPM | BODY MASS INDEX: 33.27 KG/M2 | WEIGHT: 212 LBS | SYSTOLIC BLOOD PRESSURE: 136 MMHG

## 2024-10-08 DIAGNOSIS — G56.01 CARPAL TUNNEL SYNDROME ON RIGHT: Primary | ICD-10-CM

## 2024-10-08 DIAGNOSIS — M79.641 RIGHT HAND PAIN: Primary | ICD-10-CM

## 2024-10-08 DIAGNOSIS — G56.01 CARPAL TUNNEL SYNDROME ON RIGHT: ICD-10-CM

## 2024-10-08 NOTE — PROGRESS NOTES
"Chief Complaint  Initial Evaluation of the Right Hand     Subjective      Sole Anaya presents to [facility] for initial evaluation of the right hand. She is here with numbness and tingling of the right hand.  She has difficulty with  and [facility] tasks.  She notes her hand is \"on fire on a hot stove.\"  She has pain at night and pain throughout the entire day.  The symptoms have increased the past five weeks.  She has tried braces that gave little relief.  She has tried heat and ice.     Allergies   Allergen Reactions    Latex Other (See Comments)     Patient states that she is not allergic to Latex but certain tapes    Adhesive Tape Itching and Rash        Social History     Socioeconomic History    Marital status:    Tobacco Use    Smoking status: Some Days     Current packs/day: 0.00     Average packs/day: 0.3 packs/day for 10.0 years (2.5 ttl pk-yrs)     Types: Cigarettes     Last attempt to quit: 9/15/2024     Years since quittin.0    Smokeless tobacco: Never   Vaping Use    Vaping status: Never Used   Substance and Sexual Activity    Alcohol use: Never    Drug use: Yes     Types: Marijuana    Sexual activity: Not Currently     Partners: Male     Birth control/protection: Hysterectomy        I reviewed the patient's chief complaint, history of present illness, review of systems, past medical history, surgical history, family history, social history, medications, and allergy list.     Review of Systems     Constitutional: Denies fevers, chills, weight loss  Cardiovascular: Denies chest pain, shortness of breath  Skin: Denies rashes, acute skin changes  Neurologic: Denies headache, loss of consciousness        Vital Signs:   /83   Pulse 96   Ht 170.2 cm (67\")   Wt 96.2 kg (212 lb)   SpO2 92%   BMI 33.20 kg/m²          Physical Exam  General: Alert. No acute distress    Ortho Exam        RIGHT HAND Positive Compression testing/ Positive Tinels. " NegativeFinkelsteins. Negative Thorpe's testing. Negative CMC grind testing. Positive Phalens. Full ROM of the hand, fingers, elbow and wrist. Negative Triggering of the digit. Sensation grossly intact to light touch, median, radial and ulnar nerve. Positive AIN, PIN and ulnar nerve motor function intact. Axillary nerve intact. Positive pulses.        Procedures      Imaging Results (Most Recent)       None             Result Review :         IMPRESSION: 1. Evidence of a severe, demyelinating and axonal loss, focal neuropathy of the right median nerve at the wrist (carpal tunnel). 2. No electrophysiologic evidence of a right cervical radiculopathy, right brachial plexopathy, additional focal neuropathy, polyneuropathic changes, myopathy, or motor neuron pathology.    EMG & Nerve Conduction Test    Result Date: 9/19/2024  Narrative: See attached EMG/NCS report. Electronically signed by Trip Cox PT, 09/19/24, 7:50 AM EDT.             Assessment and Plan     Diagnoses and all orders for this visit:    1. Right hand pain (Primary)    2. Carpal tunnel syndrome on right        Discussed the treatment plan with the patient. I reviewed the EMG with the patient .      Discussed the treatment options with the patient, operative vs non-operative.   The patient expressed understanding and wished to proceed with a right carpal tunnel release.       Educated on risk of smoking/nicotine. Discussed options for smoking cessation., Discussed surgery., Risks/benefits discussed with patient including, but not limited to: infection, bleeding, neurovascular damage, re-rupture, aesthetic deformity, need for further surgery, and death., Surgery pamphlet given., and Call or return if worsening symptoms.    Follow Up     2 weeks postoperatively       Patient was given instructions and counseling regarding her condition or for health maintenance advice. Please see specific information pulled into the AVS if appropriate.     Scribed for  Ida Sharpe MD by Daxa Malin MA.  10/08/24   08:42 EDT    I have personally performed the services described in this document as scribed by the above individual and it is both accurate and complete. dIa Sharpe MD 10/08/24

## 2024-10-08 NOTE — H&P (VIEW-ONLY)
"Chief Complaint  Initial Evaluation of the Right Hand     Subjective      Sole Anaya presents to [facility] for initial evaluation of the right hand. She is here with numbness and tingling of the right hand.  She has difficulty with  and FMC tasks.  She notes her hand is \"on fire on a hot stove.\"  She has pain at night and pain throughout the entire day.  The symptoms have increased the past five weeks.  She has tried braces that gave little relief.  She has tried heat and ice.     Allergies   Allergen Reactions    Latex Other (See Comments)     Patient states that she is not allergic to Latex but certain tapes    Adhesive Tape Itching and Rash        Social History     Socioeconomic History    Marital status:    Tobacco Use    Smoking status: Some Days     Current packs/day: 0.00     Average packs/day: 0.3 packs/day for 10.0 years (2.5 ttl pk-yrs)     Types: Cigarettes     Last attempt to quit: 9/15/2024     Years since quittin.0    Smokeless tobacco: Never   Vaping Use    Vaping status: Never Used   Substance and Sexual Activity    Alcohol use: Never    Drug use: Yes     Types: Marijuana    Sexual activity: Not Currently     Partners: Male     Birth control/protection: Hysterectomy        I reviewed the patient's chief complaint, history of present illness, review of systems, past medical history, surgical history, family history, social history, medications, and allergy list.     Review of Systems     Constitutional: Denies fevers, chills, weight loss  Cardiovascular: Denies chest pain, shortness of breath  Skin: Denies rashes, acute skin changes  Neurologic: Denies headache, loss of consciousness        Vital Signs:   /83   Pulse 96   Ht 170.2 cm (67\")   Wt 96.2 kg (212 lb)   SpO2 92%   BMI 33.20 kg/m²          Physical Exam  General: Alert. No acute distress    Ortho Exam        RIGHT HAND Positive Compression testing/ Positive Tinels. " NegativeFinkelsteins. Negative Thorpe's testing. Negative CMC grind testing. Positive Phalens. Full ROM of the hand, fingers, elbow and wrist. Negative Triggering of the digit. Sensation grossly intact to light touch, median, radial and ulnar nerve. Positive AIN, PIN and ulnar nerve motor function intact. Axillary nerve intact. Positive pulses.        Procedures      Imaging Results (Most Recent)       None             Result Review :         IMPRESSION: 1. Evidence of a severe, demyelinating and axonal loss, focal neuropathy of the right median nerve at the wrist (carpal tunnel). 2. No electrophysiologic evidence of a right cervical radiculopathy, right brachial plexopathy, additional focal neuropathy, polyneuropathic changes, myopathy, or motor neuron pathology.    EMG & Nerve Conduction Test    Result Date: 9/19/2024  Narrative: See attached EMG/NCS report. Electronically signed by Trip Cox PT, 09/19/24, 7:50 AM EDT.             Assessment and Plan     Diagnoses and all orders for this visit:    1. Right hand pain (Primary)    2. Carpal tunnel syndrome on right        Discussed the treatment plan with the patient. I reviewed the EMG with the patient .      Discussed the treatment options with the patient, operative vs non-operative.   The patient expressed understanding and wished to proceed with a right carpal tunnel release.       Educated on risk of smoking/nicotine. Discussed options for smoking cessation., Discussed surgery., Risks/benefits discussed with patient including, but not limited to: infection, bleeding, neurovascular damage, re-rupture, aesthetic deformity, need for further surgery, and death., Surgery pamphlet given., and Call or return if worsening symptoms.    Follow Up     2 weeks postoperatively       Patient was given instructions and counseling regarding her condition or for health maintenance advice. Please see specific information pulled into the AVS if appropriate.     Scribed for  Ida Sharpe MD by Daxa Malin MA.  10/08/24   08:42 EDT    I have personally performed the services described in this document as scribed by the above individual and it is both accurate and complete. Ida Sharpe MD 10/08/24

## 2024-10-13 NOTE — PROGRESS NOTES
Chief Complaint  Diabetes (Med mgt, A1c eval, cgm eval - discuss how to manually input glucose results to pump, vision problems - has not seen an eye doctor in several years)    Referred By: MADHURI Caal presents to [facility] for insulin pump management    History of Present Illness    Visit type:  follow-up  Diabetes type:  Type 2  Current diabetes status/concerns/issues:  She has had a lot of stress and feels this has caused some high glucose levels recently  Other health concerns:  She is scheduled for CTR on the right wrist next week.  She has quit smoking recently  Current Diabetes symptoms:    Polyuria: No   Polydipsia: No   Polyphagia: No   Blurred vision: No   Excessive fatigue: No  Known Diabetes complications:  Neuropathy: Tingling, Pain, Burning, and Shooting Pain; Location: Feet and Hands  Renal: Normal eGFR per current labs and Microalbuminuria - NEGATIVE  Eyes: No current eye exam available in record; Location: N/A; Last Eye Exam:  2 years ago ; Location: [facility] first  Amputation/Wounds:  She has prior history of a wound to the right foot which is completely healed as well and has extensive wound to the posterior left thigh which is now healed; no current wounds  GI: Indigestion and Nausea  Cardiovascular: Hypertension and Hyperlipidemia  ED: N/A  Other: None  Hypoglycemia:  Level 1 hypoglycemia (54 mg/dL - 70 mg/dL); Frequency - 0.2% per CGM  Hypoglycemia Symptoms:  shaking/tremors  Current diabetes treatment:  Tandem insulin pump using Humalog insulin.  She is using around 90 units each day  Blood glucose device:  Dexcom CGM  Blood glucose monitoring frequency:  Continuous per CGM  Blood glucose range/average:  The 14-day sensor report shows an average glucose of 151 mg/dL, with 80% in target range ( mgdL), 17% in the high range (181-250 mg/dL), 3.3% in the very high range (>250 mg/dL), 0.2% in  the low range (54-70 mg/dL) and 0% in the very low range (<54 mg/dL).   Glucose Source: Device Reviewed  Diet:  Limits high carb/sweet foods, Avoids sugary drinks  Activity/Exercise:  None    Past Medical History:   Diagnosis Date    Anxiety     B12 deficiency 2023    CTS (carpal tunnel syndrome)     Depression     Diabetes mellitus     Essential hypertension 2023    Folate deficiency 2024    Fracture of ankle     Hip arthrosis     Hyperlipidemia 2024    Knee swelling     PTSD (post-traumatic stress disorder)     Vitamin D deficiency 2023     Past Surgical History:   Procedure Laterality Date    FOOT SURGERY  2017    Infection on right foot    HIP SURGERY      HIP SURGERY Left     HYSTERECTOMY           Family History   Problem Relation Age of Onset    Diabetes Paternal Aunt     Diabetes Cousin     Diabetes Maternal Aunt      Social History     Socioeconomic History    Marital status:    Tobacco Use    Smoking status: Former     Current packs/day: 0.00     Average packs/day: 0.3 packs/day for 10.0 years (2.5 ttl pk-yrs)     Types: Cigarettes     Quit date: 9/15/2024     Years since quittin.0    Smokeless tobacco: Never   Vaping Use    Vaping status: Never Used   Substance and Sexual Activity    Alcohol use: Never    Drug use: Yes     Types: Marijuana    Sexual activity: Not Currently     Partners: Male     Birth control/protection: Hysterectomy     Allergies   Allergen Reactions    Latex Other (See Comments)     Patient states that she is not allergic to Latex but certain tapes    Adhesive Tape Itching and Rash       Current Outpatient Medications:     albuterol sulfate  (90 Base) MCG/ACT inhaler, Inhale 2 puffs 2 (Two) Times a Day. Use with space chamber., Disp: 18 g, Rfl: 5    amoxicillin (AMOXIL) 500 MG capsule, Take 2 capsules x 1 dose and then take 1 capsule 3 times a day for 3 days., Disp: 11 capsule, Rfl: 0    baclofen (LIORESAL) 20 MG  "tablet, Take 1 tablet by mouth 3 (Three) Times a Day., Disp: 90 tablet, Rfl: 0    BLACK COHOSH PO, Take  by mouth., Disp: , Rfl:     Continuous Glucose Sensor (Dexcom G7 Sensor) misc, Use 1 each Every 10 (Ten) Days., Disp: 3 each, Rfl: 5    desvenlafaxine (Pristiq) 50 MG 24 hr tablet, Take 1 tablet by mouth Daily., Disp: 90 tablet, Rfl: 1    Fluticasone-Salmeterol (ADVAIR/WIXELA) 250-50 MCG/ACT DISKUS, Inhale 1 puff 2 (Two) Times a Day., Disp: 60 each, Rfl: 5    folic acid (FOLVITE) 1 MG tablet, Take 1 tablet by mouth Daily., Disp: 90 tablet, Rfl: 3    Insulin Lispro (HumaLOG) 100 UNIT/ML injection, Up to max daily dose of 130 units per day per insulin pump, Disp: 120 mL, Rfl: 1    losartan (Cozaar) 25 MG tablet, Take 1 tablet by mouth Daily., Disp: 90 tablet, Rfl: 1    metFORMIN (FORTAMET) 1000 MG (OSM) 24 hr tablet, Take 1 tablet by mouth 2 (Two) Times a Day With Meals., Disp: 180 tablet, Rfl: 3    nicotine (NICODERM CQ) 7 MG/24HR patch, Place 1 patch on the skin as directed by provider Daily., Disp: 30 patch, Rfl: 1    ondansetron ODT (ZOFRAN-ODT) 4 MG disintegrating tablet, Place 1 tablet on the tongue Every 8 (Eight) Hours As Needed for Nausea or Vomiting., Disp: 30 tablet, Rfl: 5    rosuvastatin (Crestor) 5 MG tablet, Take 1 tablet by mouth Daily., Disp: 90 tablet, Rfl: 1    Spacer/Aero-Holding Chambers (Compact Space Chamber) device, 1 applicator Daily., Disp: 1 each, Rfl: 1    vitamin C (ASCORBIC ACID) 250 MG tablet, Take 1 tablet by mouth Daily., Disp: , Rfl:     vitamin D3 125 MCG (5000 UT) capsule capsule, Take  by mouth., Disp: , Rfl:     Objective     Vitals:    10/14/24 0840   BP: 143/82   BP Location: Right arm   Patient Position: Sitting   Cuff Size: Large Adult   Pulse: 80   Temp: 97.8 °F (36.6 °C)   TempSrc: Temporal   SpO2: 97%   Weight: 98.2 kg (216 lb 6.4 oz)   Height: 170.2 cm (67\")     Body mass index is 33.89 kg/m².    Physical Exam  Constitutional:       Appearance: Normal appearance. She is " obese.      Comments: Obesity (BMI 30 - 39.9) Pt Current BMI = 33.89    HENT:      Head: Normocephalic and atraumatic.      Right Ear: External ear normal.      Left Ear: External ear normal.      Nose: Nose normal.   Eyes:      Extraocular Movements: Extraocular movements intact.      Conjunctiva/sclera: Conjunctivae normal.   Pulmonary:      Effort: Pulmonary effort is normal.   Musculoskeletal:         General: Normal range of motion.      Cervical back: Normal range of motion.   Skin:     General: Skin is warm and dry.   Neurological:      General: No focal deficit present.      Mental Status: She is alert and oriented to person, place, and time. Mental status is at baseline.   Psychiatric:         Mood and Affect: Mood normal.         Behavior: Behavior normal.         Thought Content: Thought content normal.         Judgment: Judgment normal.             Result Review :   The following data was reviewed by: MADHURI Mansfield on 10/14/2024:    Most Recent A1C          10/14/2024    08:42   HGBA1C Most Recent   Hemoglobin A1C 7.9        A1C Last 3 Results          7/1/2024    13:11 8/15/2024    09:47 10/14/2024    08:42   HGBA1C Last 3 Results   Hemoglobin A1C 10.5  8.3  7.9      A1c collected in the office today is 7.9%, indicating Uncontrolled Type II diabetes.  This result is down from the prior result of 8.3% collected on 8/15/24     Glucose   Date Value Ref Range Status   10/14/2024 172 (H) 70 - 99 mg/dL Final     Comment:     Serial Number: [id number]:  [id number]   10/19/2020 223 (H) 74 - 99 mg/dL Final     Point of care glucose in the office today is within normal limits for nonfasting glucose      Microalbumin, Urine   Date Value Ref Range Status   08/15/2024 1.8 mg/dL Final     Creatinine, Urine   Date Value Ref Range Status   08/15/2024 198.7 mg/dL Final     Microalbumin/Creatinine Ratio   Date Value Ref Range Status   08/15/2024 9.1 0.0 - 29.0 mg/g Final     Urine microalbuminuria  collected on 8/15/2024 is negative for microalbuminuria             Assessment: She has had further improvement in her A1c but does remain above controlled status.  She has really been focusing on compliance with her insulin pump as well as her diet.  It was noted that she has been entering extra carbohydrates when her glucose has been high.  The patient was counseled on just entering the glucose level and correcting the glucose level instead of adding dictation of carbohydrates to minimize the risk of developing hypoglycemia.      Diagnoses and all orders for this visit:    1. Uncontrolled type 2 diabetes mellitus with hyperglycemia (Primary)  -     POC Glycosylated Hemoglobin (Hb A1C)    2. Type 2 diabetes mellitus with diabetic neuropathy, with long-term current use of insulin    3. Insulin pump in place    4. Uses self-applied continuous glucose monitoring device    5. Obesity (BMI 30-39.9)    Other orders  -     POC Glucose        Plan: No changes were made to the pump settings today.  The patient is encouraged to continue her dietary strategies to help further control glucose levels and weight loss.    The patient will monitor her blood glucose levels using her CGM.  If she develops problematic hyperglycemia or hypoglycemia or adverse drug reactions, she will contact the office for further instructions.        Follow Up     Return in about 3 months (around 1/14/2025) for Pump Eval, CGM Follow-up.    Patient was given instructions and counseling regarding her condition or for health maintenance advice. Please see specific information pulled into the AVS if appropriate.     Jen Guthrie, MADHURI  10/14/2024      Dictated Utilizing Dragon Dictation.  Please note that portions of this note were completed with a voice recognition program.  Part of this note may be an electronic transcription/translation of spoken language to printed text using the Dragon Dictation System.

## 2024-10-14 ENCOUNTER — OFFICE VISIT (OUTPATIENT)
Dept: DIABETES SERVICES | Facility: HOSPITAL | Age: 49
End: 2024-10-14
Payer: MEDICAID

## 2024-10-14 VITALS
BODY MASS INDEX: 33.97 KG/M2 | SYSTOLIC BLOOD PRESSURE: 143 MMHG | TEMPERATURE: 97.8 F | WEIGHT: 216.4 LBS | HEIGHT: 67 IN | OXYGEN SATURATION: 97 % | HEART RATE: 80 BPM | DIASTOLIC BLOOD PRESSURE: 82 MMHG

## 2024-10-14 DIAGNOSIS — Z79.4 TYPE 2 DIABETES MELLITUS WITH DIABETIC NEUROPATHY, WITH LONG-TERM CURRENT USE OF INSULIN: ICD-10-CM

## 2024-10-14 DIAGNOSIS — Z96.41 INSULIN PUMP IN PLACE: ICD-10-CM

## 2024-10-14 DIAGNOSIS — E11.65 UNCONTROLLED TYPE 2 DIABETES MELLITUS WITH HYPERGLYCEMIA: Primary | ICD-10-CM

## 2024-10-14 DIAGNOSIS — E66.9 OBESITY (BMI 30-39.9): ICD-10-CM

## 2024-10-14 DIAGNOSIS — E11.40 TYPE 2 DIABETES MELLITUS WITH DIABETIC NEUROPATHY, WITH LONG-TERM CURRENT USE OF INSULIN: ICD-10-CM

## 2024-10-14 DIAGNOSIS — Z97.8 USES SELF-APPLIED CONTINUOUS GLUCOSE MONITORING DEVICE: ICD-10-CM

## 2024-10-14 LAB
EXPIRATION DATE: ABNORMAL
GLUCOSE BLDC GLUCOMTR-MCNC: 172 MG/DL (ref 70–99)
HBA1C MFR BLD: 7.9 % (ref 4.5–5.7)
Lab: ABNORMAL

## 2024-10-14 PROCEDURE — 3079F DIAST BP 80-89 MM HG: CPT | Performed by: NURSE PRACTITIONER

## 2024-10-14 PROCEDURE — 82948 REAGENT STRIP/BLOOD GLUCOSE: CPT | Performed by: NURSE PRACTITIONER

## 2024-10-14 PROCEDURE — 1160F RVW MEDS BY RX/DR IN RCRD: CPT | Performed by: NURSE PRACTITIONER

## 2024-10-14 PROCEDURE — 3077F SYST BP >= 140 MM HG: CPT | Performed by: NURSE PRACTITIONER

## 2024-10-14 PROCEDURE — 3051F HG A1C>EQUAL 7.0%<8.0%: CPT | Performed by: NURSE PRACTITIONER

## 2024-10-14 PROCEDURE — 99213 OFFICE O/P EST LOW 20 MIN: CPT | Performed by: NURSE PRACTITIONER

## 2024-10-14 PROCEDURE — 95251 CONT GLUC MNTR ANALYSIS I&R: CPT | Performed by: NURSE PRACTITIONER

## 2024-10-14 PROCEDURE — 1159F MED LIST DOCD IN RCRD: CPT | Performed by: NURSE PRACTITIONER

## 2024-10-14 PROCEDURE — 83036 HEMOGLOBIN GLYCOSYLATED A1C: CPT | Performed by: NURSE PRACTITIONER

## 2024-10-14 PROCEDURE — G0463 HOSPITAL OUTPT CLINIC VISIT: HCPCS | Performed by: NURSE PRACTITIONER

## 2024-10-17 ENCOUNTER — HOSPITAL ENCOUNTER (OUTPATIENT)
Dept: MRI IMAGING | Facility: HOSPITAL | Age: 49
Discharge: HOME | End: 2024-10-17
Admitting: NURSE PRACTITIONER
Payer: MEDICAID

## 2024-10-17 DIAGNOSIS — R56.9 SEIZURE-LIKE ACTIVITY: ICD-10-CM

## 2024-10-17 LAB
CREAT BLDA-MCNC: 0.9 MG/DL (ref 0.6–1.3)
EGFRCR SERPLBLD CKD-EPI 2021: 78.5 ML/MIN/1.73

## 2024-10-17 PROCEDURE — 0 GADOBENATE DIMEGLUMINE 529 MG/ML SOLUTION: Performed by: NURSE PRACTITIONER

## 2024-10-17 PROCEDURE — A9577 INJ MULTIHANCE: HCPCS | Performed by: NURSE PRACTITIONER

## 2024-10-17 PROCEDURE — 70553 MRI BRAIN STEM W/O & W/DYE: CPT

## 2024-10-17 PROCEDURE — 82565 ASSAY OF CREATININE: CPT

## 2024-10-17 RX ADMIN — GADOBENATE DIMEGLUMINE 20 ML: 529 INJECTION, SOLUTION INTRAVENOUS at 16:03

## 2024-10-21 NOTE — PRE-PROCEDURE INSTRUCTIONS
IMPORTANT INSTRUCTIONS - PRE-ADMISSION TESTING  DO NOT DRINK, EAT OR CHEW anything after midnight the night before your procedure.     Take the following medications the morning of your procedure with JUST A SIP OF WATER: INHALERS, PRISTIQ, ZOFRAN IF NEEDED, BACLOFEN IF NEEDED  TAKE METFORMIN  PRIOR  TO  6PM THE EVENING PRIOR  TO PROCEDURE  LEAVE  INSULIN INTACT AM OF PROCEDURE    DO NOT BRING your medications to the hospital with you, UNLESS something has changed since your PRE-Admission Testing appointment.  Hold all vitamins, supplements, and NSAIDS (Non- steroidal anti-inflammatory meds) for one week prior to surgery (you MAY take Tylenol or Acetaminophen).  If you are diabetic, check your blood sugar the morning of your procedure. If it is less than 70 or if you are feeling symptomatic, call the following number for further instructions: [phone]  OUT[facility]IENT  [facility]_.  Use your inhalers/nebulizers as usual, the morning of your procedure. BRING YOUR INHALERS with you.   Bring your CPAP or BIPAP to hospital, ONLY IF YOU WILL BE SPENDING THE NIGHT.   Make sure you have a ride home and have someone who will stay with you the day of your procedure after you go home.  If you have any questions, please call your Pre-Admission Testing NurseABIEL_ at [phone]_.   Per anesthesia request, do not smoke for 24 hours before your procedure or as instructed by your surgeon.

## 2024-10-23 ENCOUNTER — ANESTHESIA (OUTPATIENT)
Dept: PERIOP | Facility: HOSPITAL | Age: 49
End: 2024-10-23
Payer: MEDICAID

## 2024-10-23 ENCOUNTER — HOSPITAL ENCOUNTER (OUTPATIENT)
Facility: HOSPITAL | Age: 49
Setting detail: HOSPITAL OUTPATIENT SURGERY
Discharge: HOME | End: 2024-10-23
Attending: ORTHOPAEDIC SURGERY | Admitting: ORTHOPAEDIC SURGERY
Payer: MEDICAID

## 2024-10-23 ENCOUNTER — ANESTHESIA EVENT (OUTPATIENT)
Dept: PERIOP | Facility: HOSPITAL | Age: 49
End: 2024-10-23
Payer: MEDICAID

## 2024-10-23 VITALS
HEART RATE: 103 BPM | OXYGEN SATURATION: 96 % | TEMPERATURE: 96.8 F | RESPIRATION RATE: 27 BRPM | BODY MASS INDEX: 34.29 KG/M2 | HEIGHT: 67 IN | SYSTOLIC BLOOD PRESSURE: 123 MMHG | DIASTOLIC BLOOD PRESSURE: 73 MMHG | WEIGHT: 218.48 LBS

## 2024-10-23 DIAGNOSIS — G56.01 CARPAL TUNNEL SYNDROME ON RIGHT: ICD-10-CM

## 2024-10-23 LAB
GLUCOSE BLDC GLUCOMTR-MCNC: 211 MG/DL (ref 70–99)
GLUCOSE BLDC GLUCOMTR-MCNC: 214 MG/DL (ref 70–99)

## 2024-10-23 PROCEDURE — 25010000002 MIDAZOLAM PER 1MG: Performed by: ANESTHESIOLOGY

## 2024-10-23 PROCEDURE — 82948 REAGENT STRIP/BLOOD GLUCOSE: CPT | Performed by: ORTHOPAEDIC SURGERY

## 2024-10-23 PROCEDURE — 82948 REAGENT STRIP/BLOOD GLUCOSE: CPT

## 2024-10-23 PROCEDURE — 25010000002 FENTANYL CITRATE (PF) 50 MCG/ML SOLUTION: Performed by: NURSE ANESTHETIST, CERTIFIED REGISTERED

## 2024-10-23 PROCEDURE — 25010000002 ONDANSETRON PER 1 MG: Performed by: NURSE ANESTHETIST, CERTIFIED REGISTERED

## 2024-10-23 PROCEDURE — 25810000003 LACTATED RINGERS PER 1000 ML: Performed by: ANESTHESIOLOGY

## 2024-10-23 PROCEDURE — 25010000002 CEFAZOLIN PER 500 MG: Performed by: ORTHOPAEDIC SURGERY

## 2024-10-23 PROCEDURE — 25010000002 PROPOFOL 200 MG/20ML EMULSION: Performed by: NURSE ANESTHETIST, CERTIFIED REGISTERED

## 2024-10-23 PROCEDURE — 25010000002 BUPIVACAINE (PF) 0.5 % SOLUTION: Performed by: ORTHOPAEDIC SURGERY

## 2024-10-23 PROCEDURE — 25010000002 LIDOCAINE PF 2% 2 % SOLUTION: Performed by: NURSE ANESTHETIST, CERTIFIED REGISTERED

## 2024-10-23 PROCEDURE — 25010000002 DEXAMETHASONE PER 1 MG: Performed by: NURSE ANESTHETIST, CERTIFIED REGISTERED

## 2024-10-23 PROCEDURE — 64721 CARPAL TUNNEL SURGERY: CPT | Performed by: ORTHOPAEDIC SURGERY

## 2024-10-23 RX ORDER — DEXMEDETOMIDINE HYDROCHLORIDE 100 UG/ML
INJECTION, SOLUTION INTRAVENOUS AS NEEDED
Status: DISCONTINUED | OUTPATIENT
Start: 2024-10-23 | End: 2024-10-23 | Stop reason: SURG

## 2024-10-23 RX ORDER — PROPOFOL 10 MG/ML
INJECTION, EMULSION INTRAVENOUS AS NEEDED
Status: DISCONTINUED | OUTPATIENT
Start: 2024-10-23 | End: 2024-10-23 | Stop reason: SURG

## 2024-10-23 RX ORDER — MAGNESIUM HYDROXIDE 1200 MG/15ML
LIQUID ORAL AS NEEDED
Status: DISCONTINUED | OUTPATIENT
Start: 2024-10-23 | End: 2024-10-23 | Stop reason: HOSPADM

## 2024-10-23 RX ORDER — IPRATROPIUM BROMIDE AND ALBUTEROL SULFATE 2.5; .5 MG/3ML; MG/3ML
3 SOLUTION RESPIRATORY (INHALATION)
Status: DISCONTINUED | OUTPATIENT
Start: 2024-10-23 | End: 2024-10-23

## 2024-10-23 RX ORDER — FENTANYL CITRATE 50 UG/ML
INJECTION, SOLUTION INTRAMUSCULAR; INTRAVENOUS AS NEEDED
Status: DISCONTINUED | OUTPATIENT
Start: 2024-10-23 | End: 2024-10-23 | Stop reason: SURG

## 2024-10-23 RX ORDER — BUPIVACAINE HYDROCHLORIDE 5 MG/ML
INJECTION, SOLUTION EPIDURAL; INTRACAUDAL AS NEEDED
Status: DISCONTINUED | OUTPATIENT
Start: 2024-10-23 | End: 2024-10-23 | Stop reason: HOSPADM

## 2024-10-23 RX ORDER — IPRATROPIUM BROMIDE AND ALBUTEROL SULFATE 2.5; .5 MG/3ML; MG/3ML
3 SOLUTION RESPIRATORY (INHALATION) ONCE
Status: DISCONTINUED | OUTPATIENT
Start: 2024-10-23 | End: 2024-10-23 | Stop reason: HOSPADM

## 2024-10-23 RX ORDER — SODIUM CHLORIDE, SODIUM LACTATE, POTASSIUM CHLORIDE, CALCIUM CHLORIDE 600; 310; 30; 20 MG/100ML; MG/100ML; MG/100ML; MG/100ML
9 INJECTION, SOLUTION INTRAVENOUS CONTINUOUS PRN
Status: DISCONTINUED | OUTPATIENT
Start: 2024-10-23 | End: 2024-10-23 | Stop reason: HOSPADM

## 2024-10-23 RX ORDER — ACETAMINOPHEN 500 MG
1000 TABLET ORAL ONCE
Status: COMPLETED | OUTPATIENT
Start: 2024-10-23 | End: 2024-10-23

## 2024-10-23 RX ORDER — HYDROCODONE BITARTRATE AND ACETAMINOPHEN 7.5; 325 MG/1; MG/1
1 TABLET ORAL ONCE AS NEEDED
Status: DISCONTINUED | OUTPATIENT
Start: 2024-10-23 | End: 2024-10-23 | Stop reason: HOSPADM

## 2024-10-23 RX ORDER — MIDAZOLAM HYDROCHLORIDE 2 MG/2ML
2 INJECTION, SOLUTION INTRAMUSCULAR; INTRAVENOUS ONCE
Status: COMPLETED | OUTPATIENT
Start: 2024-10-23 | End: 2024-10-23

## 2024-10-23 RX ORDER — ALBUTEROL SULFATE 0.83 MG/ML
2.5 SOLUTION RESPIRATORY (INHALATION) ONCE
Status: COMPLETED | OUTPATIENT
Start: 2024-10-23 | End: 2024-10-23

## 2024-10-23 RX ORDER — DEXAMETHASONE SODIUM PHOSPHATE 4 MG/ML
INJECTION, SOLUTION INTRA-ARTICULAR; INTRALESIONAL; INTRAMUSCULAR; INTRAVENOUS; SOFT TISSUE AS NEEDED
Status: DISCONTINUED | OUTPATIENT
Start: 2024-10-23 | End: 2024-10-23 | Stop reason: SURG

## 2024-10-23 RX ORDER — IPRATROPIUM BROMIDE AND ALBUTEROL SULFATE 2.5; .5 MG/3ML; MG/3ML
SOLUTION RESPIRATORY (INHALATION)
Status: COMPLETED
Start: 2024-10-23 | End: 2024-10-23

## 2024-10-23 RX ORDER — LIDOCAINE HYDROCHLORIDE 20 MG/ML
INJECTION, SOLUTION EPIDURAL; INFILTRATION; INTRACAUDAL; PERINEURAL AS NEEDED
Status: DISCONTINUED | OUTPATIENT
Start: 2024-10-23 | End: 2024-10-23 | Stop reason: SURG

## 2024-10-23 RX ORDER — ONDANSETRON 2 MG/ML
INJECTION INTRAMUSCULAR; INTRAVENOUS AS NEEDED
Status: DISCONTINUED | OUTPATIENT
Start: 2024-10-23 | End: 2024-10-23 | Stop reason: SURG

## 2024-10-23 RX ORDER — HYDROCODONE BITARTRATE AND ACETAMINOPHEN 7.5; 325 MG/1; MG/1
1 TABLET ORAL EVERY 4 HOURS PRN
Qty: 21 TABLET | Refills: 0 | Status: SHIPPED | OUTPATIENT
Start: 2024-10-23

## 2024-10-23 RX ADMIN — MIDAZOLAM HYDROCHLORIDE 2 MG: 1 INJECTION, SOLUTION INTRAMUSCULAR; INTRAVENOUS at 07:17

## 2024-10-23 RX ADMIN — ALBUTEROL SULFATE 2.5 MG: 2.5 SOLUTION RESPIRATORY (INHALATION) at 09:28

## 2024-10-23 RX ADMIN — FENTANYL CITRATE 50 MCG: 50 INJECTION, SOLUTION INTRAMUSCULAR; INTRAVENOUS at 07:33

## 2024-10-23 RX ADMIN — ACETAMINOPHEN 1000 MG: 500 TABLET ORAL at 06:45

## 2024-10-23 RX ADMIN — DEXAMETHASONE SODIUM PHOSPHATE 4 MG: 4 INJECTION, SOLUTION INTRAMUSCULAR; INTRAVENOUS at 07:32

## 2024-10-23 RX ADMIN — DEXMEDETOMIDINE HYDROCHLORIDE 10 MCG: 100 INJECTION, SOLUTION, CONCENTRATE INTRAVENOUS at 07:29

## 2024-10-23 RX ADMIN — FENTANYL CITRATE 50 MCG: 50 INJECTION, SOLUTION INTRAMUSCULAR; INTRAVENOUS at 07:27

## 2024-10-23 RX ADMIN — PROPOFOL 80 MG: 10 INJECTION, EMULSION INTRAVENOUS at 07:32

## 2024-10-23 RX ADMIN — LIDOCAINE HYDROCHLORIDE 100 MG: 20 INJECTION, SOLUTION EPIDURAL; INFILTRATION; INTRACAUDAL; PERINEURAL at 07:27

## 2024-10-23 RX ADMIN — FENTANYL CITRATE 50 MCG: 50 INJECTION, SOLUTION INTRAMUSCULAR; INTRAVENOUS at 07:38

## 2024-10-23 RX ADMIN — ONDANSETRON 4 MG: 2 INJECTION INTRAMUSCULAR; INTRAVENOUS at 07:32

## 2024-10-23 RX ADMIN — SODIUM CHLORIDE 2 G: 9 INJECTION, SOLUTION INTRAVENOUS at 07:30

## 2024-10-23 RX ADMIN — PROPOFOL 50 MG: 10 INJECTION, EMULSION INTRAVENOUS at 07:29

## 2024-10-23 RX ADMIN — IPRATROPIUM BROMIDE AND ALBUTEROL SULFATE 3 ML: 2.5; .5 SOLUTION RESPIRATORY (INHALATION) at 08:04

## 2024-10-23 RX ADMIN — PROPOFOL 50 MG: 10 INJECTION, EMULSION INTRAVENOUS at 07:26

## 2024-10-23 RX ADMIN — IPRATROPIUM BROMIDE AND ALBUTEROL SULFATE 3 ML: .5; 3 SOLUTION RESPIRATORY (INHALATION) at 08:04

## 2024-10-23 RX ADMIN — SODIUM CHLORIDE, POTASSIUM CHLORIDE, SODIUM LACTATE AND CALCIUM CHLORIDE 9 ML/HR: 600; 310; 30; 20 INJECTION, SOLUTION INTRAVENOUS at 06:45

## 2024-10-23 NOTE — OP NOTE
CARPAL TUNNEL RELEASE  Procedure Report    Patient Name:  Sole Anaya  YOB: 1975    Date of Surgery:  10/23/2024     Indications:  +CTS    Pre-op Diagnosis:   Carpal tunnel syndrome on right [G56.01]       Post-Op Diagnosis Codes:     * Carpal tunnel syndrome on right [G56.01]    Procedure/CPT® Codes:      Procedure(s):  RIGHT CARPAL TUNNEL RELEASE    Staff:  Surgeon(s):  Ida Sharpe MD    Assistant: Janis Tomlinson    Anesthesia: General    Estimated Blood Loss: none    Implants:    Nothing was implanted during the procedure    Specimen:          None        Findings: Carpal Tunnel Syndrome     Complications: None    Description of Procedure: The patient was brought to the operating room and underwent anesthesia without complication.  The patient received preoperative antibiotic and was then prepped and draped in sterile fashion.  Incision was made directly over the transverse carpal ligament and dissected down.  The ligament was identified and then opened using the #15 blade and then completed using tenotomy scissors.  Care was taken to avoid any neurovascular or tendinous structures and then checked with a [place] and a good release was achieved.  This was then thoroughly irrigated and closed using 4-0 nylon.  Half percent plain Marcaine was injected around the surgical site.  A sterile dressing was applied, followed by application of a splint.  The tourniquet was then deflated.  The patient was taken to the recovery room in stable condition.  There were no complications.    Assistant: Janis Tomlinson  was responsible for performing the following activities: Retraction, Suction, Irrigation, Closing, and Placing Dressing and their skilled assistance was necessary for the success of this case.    Ida Sharpe MD     Date: 10/23/2024  Time: 07:52 EDT

## 2024-10-23 NOTE — ANESTHESIA POSTPROCEDURE EVALUATION
Patient: Sole Anaya    Procedure Summary       Date: 10/23/24 Room / Location: [facility] OR  / [facility]    Anesthesia Start: 0721 Anesthesia Stop: 0804    Procedure: RIGHT CARPAL TUNNEL RELEASE (Right: Wrist) Diagnosis:       Carpal tunnel syndrome on right      (Carpal tunnel syndrome on right [G56.01])    Surgeons: Ida Sharpe MD Provider: Hugo Ta MD    Anesthesia Type: general ASA Status: 3            Anesthesia Type: general    Vitals  Vitals Value Taken Time   /73 10/23/24 1011   Temp 36 °C (96.8 °F) 10/23/24 0756   Pulse 105 10/23/24 1018   Resp 27 10/23/24 0806   SpO2 91 % 10/23/24 1018   Vitals shown include unfiled device data.        Post Anesthesia Care and Evaluation    Patient location during evaluation: bedside  Patient participation: complete - patient participated  Level of consciousness: awake    Airway patency: patent  PONV Status: none  Cardiovascular status: acceptable  Respiratory status: acceptable  Hydration status: acceptable

## 2024-10-23 NOTE — ANESTHESIA PREPROCEDURE EVALUATION
Anesthesia Evaluation     Patient summary reviewed and Nursing notes reviewed                Airway   Mallampati: I  TM distance: >3 FB  Neck ROM: full  No difficulty expected  Dental      Pulmonary - negative pulmonary ROS and normal exam    breath sounds clear to auscultation  Cardiovascular - normal exam    Rhythm: regular  Rate: normal    (+) hypertension, hyperlipidemia      Neuro/Psych  (+) seizures, numbness, psychiatric history  GI/Hepatic/Renal/Endo    (+) obesity, diabetes mellitus    Musculoskeletal     Abdominal    Substance History - negative use     OB/GYN negative ob/gyn ROS         Other   arthritis,                 Anesthesia Plan    ASA 3     general     intravenous induction     Anesthetic plan, risks, benefits, and alternatives have been provided, discussed and informed consent has been obtained with: patient.    CODE STATUS:

## 2024-10-23 NOTE — DISCHARGE INSTRUCTIONS
DISCHARGE INSTRUCTIONS  CARPAL TUNNEL RELEASE      For your surgery you had:  General anesthesia (you may have a sore throat for the first 24 hours)    Local anesthesia  Monitored anesthesia care      You may experience dizziness, drowsiness, or lightheadedness for several hours following surgery.  Do not stay alone today or tonight.  Limit your activity for 24 hours.  Resume your diet slowly.    You should not drive or operate machinery, drink alcohol, or sign legally binding documents for 24 hours or while you are taking pain medication.   Elevate affected arm above the heart level on a pillow when resting.   Use ice to affected area for 48-72 hours. Apply 20 minutes on - 20 minutes off. Do NOT apply directly to skin.  Exercise fingers frequently by making a full fist and fully straightening the fingers. This will help prevent swelling and stiffness.  Do NOT do any heavy lifting, pulling or strenuous activities using the affected hand. [x] Keep splint clean and dry.    [x] Do NOT submerge in water.  [x] Keep incision area clean and dry.  [x] You may shower or bathe but keep splint dry     .  NOTIFY YOUR DOCTOR IF YOU EXPERIENCE ANY OF THE FOLLOWING:  Temperature greater than 101 degrees Fahrenheit  Shaking Chills  Redness or excessive drainage from incision  Nausea, vomiting and/or pain that is not controlled by prescribed medications  Increase in bleeding or bleeding that is excessive  Unable to urinate in 6 hours after surgery  If unable to reach your doctor, please go to the closest Emergency Room  Last dose of pain medication was given at:   .  You should see   for follow-up care   on   .  Phone number:      SPECIAL INSTRUCTIONS:               I have read and received the above instructions.

## 2024-10-30 ENCOUNTER — OFFICE VISIT (OUTPATIENT)
Dept: PSYCHIATRY | Facility: CLINIC | Age: 49
End: 2024-10-30
Payer: MEDICAID

## 2024-10-30 VITALS
HEART RATE: 101 BPM | HEIGHT: 67 IN | SYSTOLIC BLOOD PRESSURE: 135 MMHG | BODY MASS INDEX: 34.21 KG/M2 | OXYGEN SATURATION: 99 % | WEIGHT: 218 LBS | DIASTOLIC BLOOD PRESSURE: 84 MMHG

## 2024-10-30 DIAGNOSIS — F51.4 NIGHT TERRORS: ICD-10-CM

## 2024-10-30 DIAGNOSIS — F60.3 BORDERLINE PERSONALITY DISORDER IN ADULT: ICD-10-CM

## 2024-10-30 DIAGNOSIS — F43.10 POST TRAUMATIC STRESS DISORDER (PTSD): ICD-10-CM

## 2024-10-30 DIAGNOSIS — F39 MOOD DISORDER: Primary | ICD-10-CM

## 2024-10-30 DIAGNOSIS — F44.5 PSYCHOGENIC NONEPILEPTIC SEIZURE: ICD-10-CM

## 2024-10-30 RX ORDER — PRAZOSIN HYDROCHLORIDE 1 MG/1
1 CAPSULE ORAL NIGHTLY
Qty: 30 CAPSULE | Refills: 2 | Status: SHIPPED | OUTPATIENT
Start: 2024-10-30 | End: 2025-01-28

## 2024-10-30 RX ORDER — ARIPIPRAZOLE 5 MG/1
5 TABLET ORAL DAILY
Qty: 30 TABLET | Refills: 2 | Status: SHIPPED | OUTPATIENT
Start: 2024-10-30 | End: 2025-01-28
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
## 2024-10-31 RX ORDER — BACLOFEN 20 MG/1
20 TABLET ORAL 3 TIMES DAILY
Qty: 90 TABLET | Refills: 0 | Status: SHIPPED | OUTPATIENT
Start: 2024-10-31

## 2024-10-31 NOTE — TREATMENT PLAN
Multi-Disciplinary Problems (from Behavioral Health Treatment Plan)      Active Problems       Problem: Post Traumatic Stress  Start Date: 10/30/24      Problem Details: The patient self-scales this problem as a 3 with 10 being the worst.          Goal Priority Start Date Expected End Date End Date    Patient will process and move through trauma in a way that improves self regard and the patients ability to function optimally in the world around them. -- 10/30/24 04/30/25 --    Goal Details: Progress toward goal:  The patient self-scales their progress related to this goal as a 3 with 10 being the worst.          Goal Intervention Frequency Start Date End Date    Assist patient in identifying ways that trauma has negatively impacted their view of themselves and the world. Weekly 10/30/24 --    Intervention Details: Duration of treatment until remission of symptoms.          Goal Intervention Frequency Start Date End Date    Process trauma in the context of the safe session environment. Weekly 10/30/24 --    Intervention Details: Duration of treatment until remission of symptoms.          Goal Intervention Frequency Start Date End Date    Develop a plan of behavior changes that will reduce the stress of the trauma. Weekly 10/30/24 --    Intervention Details: Duration of treatment until remission of symptoms.                          Reviewed By       Marleny Montague APRN 10/30/24 [id number]    Marleny Montague APRN 10/30/24 2242                     I have discussed and reviewed this treatment plan with the patient.  It has been printed for signatures.

## 2024-11-07 ENCOUNTER — OFFICE VISIT (OUTPATIENT)
Dept: ORTHOPEDIC SURGERY | Facility: CLINIC | Age: 49
End: 2024-11-07
Payer: MEDICAID

## 2024-11-07 VITALS
HEIGHT: 67 IN | WEIGHT: 218.26 LBS | DIASTOLIC BLOOD PRESSURE: 76 MMHG | BODY MASS INDEX: 34.26 KG/M2 | OXYGEN SATURATION: 93 % | HEART RATE: 95 BPM | SYSTOLIC BLOOD PRESSURE: 113 MMHG

## 2024-11-07 DIAGNOSIS — Z98.890 S/P CARPAL TUNNEL RELEASE: ICD-10-CM

## 2024-11-07 DIAGNOSIS — M79.641 RIGHT HAND PAIN: Primary | ICD-10-CM

## 2024-11-07 PROCEDURE — 99024 POSTOP FOLLOW-UP VISIT: CPT

## 2024-11-07 PROCEDURE — 3074F SYST BP LT 130 MM HG: CPT

## 2024-11-07 PROCEDURE — 3078F DIAST BP <80 MM HG: CPT

## 2024-11-07 NOTE — PROGRESS NOTES
"Chief Complaint  Pain and Follow-up of the Right Wrist and Suture / Staple Removal    Subjective      Sole Anaya presents to [facility] for follow up of her right wrist.  Patient is 2 weeks status post right carpal tunnel release performed by Dr. Sharpe on 10/23/2024.  Arrives today with her splint intact.  Patient states that she is doing well.  She reports that her sensations have completely resolved and she denies any numbness or tingling or burning left to her hand.    Allergies   Allergen Reactions    Adhesive Tape Itching and Rash       Objective     Vital Signs:   Vitals:    11/07/24 1013   BP: 113/76   Pulse: 95   SpO2: 93%   Weight: 99 kg (218 lb 4.1 oz)   Height: 170.2 cm (67\")     Body mass index is 34.18 kg/m².    I reviewed the patient's chief complaint, history of present illness, review of systems, past medical history, surgical history, family history, social history, medications, and allergy list.     REVIEW OF SYSTEMS    Constitutional: Denies fevers, chills, weight loss  Cardiovascular: Denies chest pain, shortness of breath  Skin: Denies rashes, acute skin changes  Neurologic: Denies headache, loss of consciousness  MSK: Right wrist pain.     Ortho Exam  General: Alert. No acute distress.  Right upper extremity: Splint removed.  Sutures removed today without complications.  Well-healing incision about the palm.  No active drainage or redness noted. No concerning signs of infection. Full elbow range of motion. Finger range of motion intact.  Mild stiffness with finger range of motion. Demonstrates active wrist flexion and extension with associated stiffness. Thumb opposition intact. Palmar abduction of the thumb intact.  Sensation intact. Neurovascularly intact. Palpable radial pulse.          Imaging Results (Most Recent)       None                Assessment and Plan   Diagnoses and all orders for this visit:    1. Right hand pain (Primary)    2. S/P carpal tunnel " release         Sole Anaya presents today 2 weeks post op right carpal tunnel release performed by Dr. Sharpe on 9/23/2024. Sutures removed today in office without complications. No active drainage or redness noted. No concerning signs of infection.  Incision site care was reviewed today. Patient instructed not to soak or submerge incision. Do not apply any lotions, creams, or ointments to the incision at this time.  We discussed concerning signs and symptoms regarding the incision site.  Patient understood and agreed.  Continue icing and elevation as needed help with pain and swelling.     Patient advised on return to carpal tunnel braces nightly and with activity.  Okay for patient to apply gauze over the incision while wearing the wrist brace to avoid irritation.   Carpal tunnel brace provided in office today .  Discussed avoiding repetitive range of motion of the hand or wrist.  No heavy lifting, pushing or pulling until incision is fully healed.  Home exercises were provided and demonstrated in office today. Discussed the importance of these exercises, including working on making a tight fist. We discussed ordering formal occupational therapy however patient will perform home exercises like discussed and demonstrated in office.  Patient is welcome to contact us if she would like to proceed with outpatient physical therapy orders.       Patient will follow up in 4 weeks for reevaluation, if needed.  No imaging needed.    Call or return if symptoms worsen or patient has any concerns.          Tobacco Use: Medium Risk (11/7/2024)    Patient History     Smoking Tobacco Use: Former     Smokeless Tobacco Use: Never     Passive Exposure: Not on file     Patient reports they have a history of tobacco use; encouraged continued tobacco cessation for further health benefits.            Follow Up   No follow-ups on file.  There are no Patient Instructions on file for this visit.  Patient was given instructions and  counseling regarding her condition or for health maintenance advice. Please see specific information pulled into the AVS if appropriate.       Dictated Utilizing Dragon Dictation. Please note that portions of this note were completed with a voice recognition program. Part of this note may be an electronic transcription/translation of spoken language to printed text using the Dragon Dictation System.

## 2024-12-02 ENCOUNTER — TELEPHONE (OUTPATIENT)
Dept: NEUROLOGY | Facility: CLINIC | Age: 49
End: 2024-12-02
Payer: MEDICAID

## 2024-12-02 NOTE — TELEPHONE ENCOUNTER
I have resent the orders to [facility], these were originally sent on 9/3. I called and spoke with Marleny, she states she doesn't see the order either.

## 2024-12-02 NOTE — TELEPHONE ENCOUNTER
"Caller: Sole Anaya \"Salma\"    Relationship: Self    Best call back number: [phone]    What is the medical concern/diagnosis:     What specialty or service is being requested: EEG    What is the provider, practice or medical service name: [facility]    What is the office location:     What is the office phone number:     Any additional details: STATED THAT [facility] NEEDS AN ORDER FOR AN EEG.  STATED [facility] DOES NOT HAVE THE REFERRAL    PLEASE ADVISE   "

## 2024-12-09 NOTE — TELEPHONE ENCOUNTER
"  Caller: Sole Anaya \"Salma\"    Relationship to Patient: Self    Phone Number:   Telephone Information:   Mobile [phone]         When did it start: [facility] STATES THE ORDER WAS STILL NOT RECEIVED. PLEASE RE-FAX [phone]  "

## 2024-12-10 DIAGNOSIS — Z79.4 TYPE 2 DIABETES MELLITUS WITH DIABETIC NEUROPATHY, WITH LONG-TERM CURRENT USE OF INSULIN: Primary | ICD-10-CM

## 2024-12-10 DIAGNOSIS — E11.40 TYPE 2 DIABETES MELLITUS WITH DIABETIC NEUROPATHY, WITH LONG-TERM CURRENT USE OF INSULIN: Primary | ICD-10-CM

## 2024-12-10 RX ORDER — CAPSAICIN 8 %
4 KIT TOPICAL
Qty: 1 KIT | Refills: 3 | Status: SHIPPED | OUTPATIENT
Start: 2024-12-10

## 2024-12-13 NOTE — TELEPHONE ENCOUNTER
This has been received and patient has been scheduled for February 4th, her f/u here will need to be rescheduled after her Veeg.
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
## 2024-12-17 ENCOUNTER — OFFICE VISIT (OUTPATIENT)
Dept: PSYCHIATRY | Facility: CLINIC | Age: 49
End: 2024-12-17
Payer: MEDICAID

## 2024-12-17 VITALS
HEART RATE: 82 BPM | WEIGHT: 216 LBS | DIASTOLIC BLOOD PRESSURE: 82 MMHG | SYSTOLIC BLOOD PRESSURE: 129 MMHG | BODY MASS INDEX: 33.9 KG/M2 | OXYGEN SATURATION: 97 % | HEIGHT: 67 IN

## 2024-12-17 DIAGNOSIS — F60.3 BORDERLINE PERSONALITY DISORDER IN ADULT: ICD-10-CM

## 2024-12-17 DIAGNOSIS — F51.4 NIGHT TERRORS: ICD-10-CM

## 2024-12-17 DIAGNOSIS — F39 MOOD DISORDER: ICD-10-CM

## 2024-12-17 DIAGNOSIS — F44.5 PSYCHOGENIC NONEPILEPTIC SEIZURE: ICD-10-CM

## 2024-12-17 DIAGNOSIS — F43.10 POST TRAUMATIC STRESS DISORDER (PTSD): Primary | ICD-10-CM

## 2024-12-19 ENCOUNTER — TELEPHONE (OUTPATIENT)
Dept: DIABETES SERVICES | Facility: HOSPITAL | Age: 49
End: 2024-12-19
Payer: MEDICAID

## 2024-12-19 ENCOUNTER — TELEPHONE (OUTPATIENT)
Dept: NEUROLOGY | Facility: CLINIC | Age: 49
End: 2024-12-19
Payer: MEDICAID

## 2024-12-19 DIAGNOSIS — E11.65 UNCONTROLLED TYPE 2 DIABETES MELLITUS WITH HYPERGLYCEMIA: ICD-10-CM

## 2024-12-19 NOTE — TELEPHONE ENCOUNTER
Called pt who stated she was told to migue if sensor messed up again which she reports a hiccup in sensor and it being to early to refill at pharmacy she is inquiring about samples of Dexcom G7 sensors  Pls advise

## 2024-12-19 NOTE — TELEPHONE ENCOUNTER
"Patient is asking about MRI, I called to r/s appt after vEEG and she's stating that the MRI states that a 3 month reassessment MRI was recommended. I see that in the notes \"No acute intracranial process noted however, there is a nonspecific focus of enhancement in the left midbrain possibly vascular however a follow-up 3-month brain MRI with and without contrast is recommended for reassessment.\"     She is asking if that should be ordered before she returns in February.   "

## 2024-12-19 NOTE — TELEPHONE ENCOUNTER
Called and spoke with Sole Anaya to inform her MADHURI Meyers signed out samples of Dexcom G7 sensors and they are ready for , pt verbalized understanding and stated she will be in 12-20-24 to  samples

## 2024-12-19 NOTE — TELEPHONE ENCOUNTER
Caller: SANAZ YE     Relationship: SELF     Callback number: [phone]   Is it ok to leave a message: [] Yes [] No    Requested medication for samples: DEXCOM G7    How much medication does the patient currently have left: 0    Who will be picking up the samples: SELF    Do you need information about patient financial assistance for this medication: [] Yes [] No    Additional details provided:

## 2024-12-20 ENCOUNTER — OFFICE VISIT (OUTPATIENT)
Dept: INTERNAL MEDICINE | Age: 49
End: 2024-12-20
Payer: MEDICAID

## 2024-12-20 VITALS
WEIGHT: 216 LBS | SYSTOLIC BLOOD PRESSURE: 115 MMHG | OXYGEN SATURATION: 95 % | HEIGHT: 67 IN | HEART RATE: 85 BPM | TEMPERATURE: 97.8 F | BODY MASS INDEX: 33.9 KG/M2 | DIASTOLIC BLOOD PRESSURE: 80 MMHG

## 2024-12-20 DIAGNOSIS — G89.29 CHRONIC RIGHT HIP PAIN: Primary | Chronic | ICD-10-CM

## 2024-12-20 DIAGNOSIS — R90.89 ABNORMAL BRAIN MRI: Primary | ICD-10-CM

## 2024-12-20 DIAGNOSIS — E10.43 DIABETIC AUTONOMIC NEUROPATHY ASSOCIATED WITH TYPE 1 DIABETES MELLITUS: Chronic | ICD-10-CM

## 2024-12-20 DIAGNOSIS — F17.200 CURRENT EVERY DAY SMOKER: ICD-10-CM

## 2024-12-20 DIAGNOSIS — M25.551 CHRONIC RIGHT HIP PAIN: Primary | Chronic | ICD-10-CM

## 2024-12-20 RX ORDER — ALBUTEROL SULFATE 90 UG/1
2 INHALANT RESPIRATORY (INHALATION) 2 TIMES DAILY
Qty: 18 G | Refills: 5 | Status: SHIPPED | OUTPATIENT
Start: 2024-12-20

## 2024-12-20 RX ORDER — FLUTICASONE PROPIONATE AND SALMETEROL 250; 50 UG/1; UG/1
1 POWDER RESPIRATORY (INHALATION)
Qty: 60 EACH | Refills: 5 | Status: SHIPPED | OUTPATIENT
Start: 2024-12-20

## 2024-12-20 NOTE — TELEPHONE ENCOUNTER
Spoke with patient. Relayed message that Sarah has ordered repeat Brain MRI. Central Scheduling will be calling her. Patient voiced understanding.

## 2024-12-20 NOTE — TELEPHONE ENCOUNTER
"Hub staff attempted to follow warm transfer process and was unsuccessful     Caller: Sole Anaya \"Salma\"    Relationship to patient: Self    Best call back number: [phone]; OK TO M    Patient is needing: PATIENT CAME TO THE NEW OFFICE AT 13:05 12/20/24 TO  HER SAMPLES. THE DOORS WERE LOCKED AND THE PATIENT HAD TO LEAVE. DUE TO THIS, SHE WILL EITHER HAVE TO COME IN MONDAY OR HAVE SOMEONE ELSE  THE SAMPLES FOR HER. PLEASE CALL THE PATIENT TO LET HER KNOW THE HOURS WITHIN SHE CAN PICK THE SAMPLES UP.      ALTERNATE  MAY BE: JOHNNY BENITEZ, GABRIELLE ORTIZ, OR EUGENIA DUARTE  "

## 2025-01-02 RX ORDER — BACLOFEN 20 MG/1
20 TABLET ORAL 3 TIMES DAILY
Qty: 90 TABLET | Refills: 0 | Status: SHIPPED | OUTPATIENT
Start: 2025-01-02

## 2025-01-03 ENCOUNTER — TELEPHONE (OUTPATIENT)
Dept: INTERNAL MEDICINE | Age: 50
End: 2025-01-03
Payer: MEDICAID

## 2025-01-03 NOTE — TELEPHONE ENCOUNTER
Patient called stating she seen evy December 20th, patient had already seen Dr. Tang and wanted another ortho referral to another specialist but got sent back to [facility]. Patient would like another referral to Ortho Surgeon to another provider in [place].

## 2025-01-09 ENCOUNTER — TELEPHONE (OUTPATIENT)
Dept: INTERNAL MEDICINE | Age: 50
End: 2025-01-09

## 2025-01-09 NOTE — TELEPHONE ENCOUNTER
Caller: [facility]    Relationship to patient: Other    Best call back number: [phone]     Patient is needing:  [facility] STAFF STATES THAT SHE WANTED TO INFORM MADHURI HUDSON THAT THE PATIENT HAS CANCELLED HER CONSULTATION APPOINTMENT WITH KEVYN MARIA

## 2025-01-30 NOTE — PROGRESS NOTES
Chief Complaint  Diabetes (Med management, A1C, CGM/Pump Eval)    Referred By: MADHURI Caal    Subjective          Patient or patient representative verbalized consent for the use of Ambient Listening during the visit with  MADHURI Mansfield for chart documentation. 1/31/2025  10:12 VALENTINA Anaya presents to [facility] for insulin pump management    History of Present Illness    Visit type:  follow-up  Diabetes type:  Type 2  Current diabetes status/concerns/issues:     History of Present Illness  The patient presents for evaluation of diabetes.    She reports no significant concerns related to her diabetes today, believing her condition is well-managed. She has been experiencing hypoglycemic episodes but does not report any associated symptoms. She has been monitoring her blood glucose levels closely and taking appropriate measures when they begin to drop. Her appetite has been diminished due to a recent illness lasting 2 to 3 weeks, which she attributes to influenza. This has resulted in more frequent low blood glucose readings over the past week. She has made dietary modifications, including reducing her sugar intake and eliminating certain foods. She is currently on a regimen of metformin, taking 1000 mg twice daily and the Tandem insulin pump. She reports no other new health issues.    She is scheduled for removal of an antibiotic hip spacer surgery. The procedure was initially delayed due to elevated blood glucose levels, which posed a risk of infection.     She is scheduled for upcoming MRI and EEG due to spot on her brain issues of losing speech and disorientation and shaking/trembling; glucose is okay with these events      Current Diabetes symptoms:    Polyuria: No   Polydipsia: No   Polyphagia: No   Blurred vision: No   Excessive fatigue: No  Known Diabetes complications:  Neuropathy: Tingling, Pain, Burning, and Shooting Pain; Location: Feet and  Hands  Renal: Stage II mild (GFR = 60-89 mL/min) and Microalbuminuria - NEGATIVE  Eyes: No current eye exam available in record; Location: N/A  Amputation/Wounds:  She has prior history of a wound to the right foot which is completely healed as well and has extensive wound to the posterior left thigh which is now healed; no current wounds  GI: Indigestion and Nausea  Cardiovascular: Hypertension and Hyperlipidemia  ED: N/A  Other: None  Hypoglycemia:  Level 1 hypoglycemia (54 mg/dL - 70 mg/dL); Frequency - 0.6% per CGM and Level 2 (less than 54 mg/dL); Frequency - 0.1% per CGM  Hypoglycemia Symptoms:   she will have alerts on her sensor before she has symptoms  Current diabetes treatment:  Tandem insulin pump using Humalog insulin.  She is using around 90 units each day and Metformin 1000 mg twice a day  Blood glucose device:  Dexcom CGM  Blood glucose monitoring frequency:  Continuous per CGM  Blood glucose range/average:  The 14-day sensor report shows an average glucose of 125 mg/dL, with 92% in target range ( mgdL), 7.5% in the high range (181-250 mg/dL), 0% in the very high range (>250 mg/dL), 0.6% in the low range (54-70 mg/dL) and 0.1% in the very low range (<54 mg/dL).   Glucose Source: Device Reviewed  Diet:  Limits high carb/sweet foods, Avoids sugary drinks  Activity/Exercise:  None    Past Medical History:   Diagnosis Date    Anesthesia     PTSD    Anxiety     B12 deficiency 07/11/2023    Bipolar disorder     Borderline personality disorder 2002    Chronic pain disorder     CTS (carpal tunnel syndrome) 2024    Depression     Diabetes mellitus     Dissociative identity disorder     Essential hypertension 07/11/2023    Folate deficiency 05/05/2024    Fracture of ankle 2017    Hip arthrosis 2020    Hyperlipidemia 05/05/2024    Knee swelling 2019    PTSD (post-traumatic stress disorder)     Seizures     Substance abuse 03/2001    Marijuana    Suicide attempt 1996    Vitamin D deficiency 06/16/2023  "    Past Surgical History:   Procedure Laterality Date    CARPAL TUNNEL RELEASE Right 10/23/2024    Procedure: RIGHT CARPAL TUNNEL RELEASE;  Surgeon: Ida Sharpe MD;  [place] [facility] [place] [facility] [place]  Service: [place]opedics;  Laterality: Right;     SECTION  01    FOOT SURGERY  2017    Infection on right foot    HIP SURGERY Right     Spacer currently in hip-     HIP SURGERY Left     Posterior thigh    HYSTERECTOMY           Family History   Problem Relation Age of Onset    Diabetes Paternal Aunt     Diabetes Cousin     Diabetes Maternal Aunt     Alcohol abuse Father     Hyperlipidemia Mother     Hyperlipidemia Maternal Grandfather     Cancer Son         Stage 4 Lymphoma    Developmental Disability Son     Learning disabilities Son      Social History     Socioeconomic History    Marital status:    Tobacco Use    Smoking status: Former     Average packs/day: 0.4 packs/day for 24.0 years (9.5 ttl pk-yrs)     Types: Cigarettes     Start date: 10/1/2010     Quit date: 9/15/2024     Years since quittin.3    Smokeless tobacco: Never   Vaping Use    Vaping status: Never Used   Substance and Sexual Activity    Alcohol use: Never    Drug use: Yes     Types: Marijuana     Comment: Hx of Meth/IV drugs, \"clean\" for 4 years    Sexual activity: Not Currently     Partners: Male     Birth control/protection: Hysterectomy     Allergies   Allergen Reactions    Adhesive Tape Itching and Rash       Current Outpatient Medications:     albuterol sulfate  (90 Base) MCG/ACT inhaler, Inhale 2 puffs 2 (Two) Times a Day. Use with space chamber., Disp: 18 g, Rfl: 5    baclofen (LIORESAL) 20 MG tablet, Take 1 tablet by mouth 3 (three) times a day., Disp: 90 tablet, Rfl: 0    Continuous Glucose Sensor (Dexcom G7 Sensor) misc, Use 1 each Every 10 (Ten) Days., Disp: 3 each, Rfl: 5    desvenlafaxine (Pristiq) 50 MG 24 hr tablet, Take 1 tablet by mouth Daily., Disp: 90 tablet, Rfl: 1    Fluticasone-Salmeterol " "(ADVAIR/WIXELA) 250-50 MCG/ACT DISKUS, Inhale 1 puff 2 (Two) Times a Day., Disp: 60 each, Rfl: 5    folic acid (FOLVITE) 1 MG tablet, Take 1 tablet by mouth Daily., Disp: 90 tablet, Rfl: 3    Insulin Lispro (HumaLOG) 100 UNIT/ML injection, Up to max daily dose of 130 units per day per insulin pump, Disp: 120 mL, Rfl: 1    losartan (Cozaar) 25 MG tablet, Take 1 tablet by mouth Daily., Disp: 90 tablet, Rfl: 1    metFORMIN (FORTAMET) 1000 MG (OSM) 24 hr tablet, Take 1 tablet by mouth 2 (Two) Times a Day With Meals., Disp: 180 tablet, Rfl: 3    ondansetron ODT (ZOFRAN-ODT) 4 MG disintegrating tablet, Place 1 tablet on the tongue Every 8 (Eight) Hours As Needed for Nausea or Vomiting., Disp: 30 tablet, Rfl: 5    prazosin (MINIPRESS) 1 MG capsule, Take 1 capsule by mouth Every Night for 90 days., Disp: 30 capsule, Rfl: 2    rosuvastatin (Crestor) 5 MG tablet, Take 1 tablet by mouth Daily. (Patient taking differently: Take 1 tablet by mouth Every Night.), Disp: 90 tablet, Rfl: 1    Spacer/Aero-Holding Chambers (Compact Space Chamber) device, 1 applicator Daily., Disp: 1 each, Rfl: 1    vitamin C (ASCORBIC ACID) 250 MG tablet, Take 1 tablet by mouth Daily., Disp: , Rfl:     vitamin D3 125 MCG (5000 UT) capsule capsule, Take  by mouth., Disp: , Rfl:     capsaicin-cleansing gel (Qutenza, 4 Patch,) 8 % kit topical system, Apply 4 patches topically to the appropriate area as directed Every 3 (Three) Months. (Patient not taking: Reported on 1/31/2025), Disp: 1 kit, Rfl: 3    HYDROcodone-acetaminophen (NORCO) 7.5-325 MG per tablet, Take 1 tablet by mouth Every 4 (Four) Hours As Needed for Moderate Pain (Pain). (Patient not taking: Reported on 1/31/2025), Disp: 21 tablet, Rfl: 0    Objective     Vitals:    01/31/25 0947   BP: 107/76   BP Location: Right arm   Patient Position: Sitting   Cuff Size: Adult   Pulse: 90   Temp: 98 °F (36.7 °C)   TempSrc: Temporal   SpO2: 94%   Weight: 98 kg (216 lb)   Height: 170.2 cm (67\")     Body " mass index is 33.83 kg/m².    Physical Exam  Constitutional:       Appearance: Normal appearance. She is obese.      Comments: Obesity (BMI 30 - 39.9) Pt Current BMI = 33.83    HENT:      Head: Normocephalic and atraumatic.      Right Ear: External ear normal.      Left Ear: External ear normal.      Nose: Nose normal.   Eyes:      Extraocular Movements: Extraocular movements intact.      Conjunctiva/sclera: Conjunctivae normal.   Pulmonary:      Effort: Pulmonary effort is normal.   Musculoskeletal:         General: Normal range of motion.      Cervical back: Normal range of motion.   Skin:     General: Skin is warm and dry.   Neurological:      General: No focal deficit present.      Mental Status: She is alert and oriented to person, place, and time. Mental status is at baseline.   Psychiatric:         Mood and Affect: Mood normal.         Behavior: Behavior normal.         Thought Content: Thought content normal.         Judgment: Judgment normal.             Result Review :   The following data was reviewed by: MADHURI Mansfield on 01/13/2025:    Most Recent A1C          1/31/2025    09:54   HGBA1C Most Recent   Hemoglobin A1C 7.2        A1C Last 3 Results          8/15/2024    09:47 10/14/2024    08:42 1/31/2025    09:54   HGBA1C Last 3 Results   Hemoglobin A1C 8.3  7.9  7.2      A1c collected in the office today is 7.2%, indicating Uncontrolled Type II diabetes.  This result is down from the prior result of 7.9% collected on 10/14/24     Glucose   Date Value Ref Range Status   01/31/2025 133 (H) 70 - 99 mg/dL Final     Comment:     Serial Number: [id number]:  [id number]     Point of care glucose in the office today is within normal limits for nonfasting glucose    Creatinine   Date Value Ref Range Status   10/17/2024 0.90 0.60 - 1.30 mg/dL Final     Comment:     Serial Number: [id number]:  [id number]   08/30/2021 0.80 0.55 - 1.02 mg/dL Final     eGFR   Date Value Ref Range Status   10/17/2024  78.5 >60.0 mL/min/1.73 Final     Labs collected on 10/17/24 show Stage II mild (GFR = 60-89mL/min)                Diagnoses and all orders for this visit:    1. Uncontrolled type 2 diabetes mellitus with hyperglycemia (Primary)  -     POC Glycosylated Hemoglobin (Hb A1C)    2. Type 2 diabetes mellitus with diabetic neuropathy, with long-term current use of insulin    3. Type 2 diabetes mellitus with stage 2 chronic kidney disease, with long-term current use of insulin    4. Insulin pump in place    5. Uses self-applied continuous glucose monitoring device    Other orders  -     POC Glucose        Assessment & Plan  1. Diabetes mellitus.  Her glucose management is commendable, with an average glucose level of 125 and 92% of readings within the target range. The Glucose Management Indicator (GMI) suggests an A1c of 6.3, although the actual A1c is 7.2, down from 7.9 in October. She has been experiencing some low blood sugar episodes but reports no symptoms. She is currently on metformin 1000 mg twice a day and the Tandem insulin pump. She is advised to continue her current regimen and monitor her glucose levels closely, especially before her upcoming surgery. She is also advised to ensure her pump is on the same side as her sensor for better connectivity.     2. Hip surgery.  She needs to have antibiotic hip spacer removal surgery. Her glucose levels are now well-controlled, making her a suitable candidate for the procedure. She is advised to inform the office if the surgery is delayed or if there are any changes in her condition.          The patient will monitor her blood glucose levels per continuous glucose monitor.  If she develops problematic hyperglycemia or hypoglycemia or adverse drug reactions, she will contact the office for further instructions.        Follow Up     Return in about 3 months (around 4/30/2025) for Pump Eval, CGM Follow-up.    Patient was given instructions and counseling regarding her  condition or for health maintenance advice. Please see specific information pulled into the AVS if appropriate.     Jen Guthrie, APRN  01/31/2025        Dictated Utilizing Dragon Dictation.  Please note that portions of this note were completed with a voice recognition program.  Part of this note may be an electronic transcription/translation of spoken language to printed text using the Dragon Dictation System.

## 2025-01-31 ENCOUNTER — OFFICE VISIT (OUTPATIENT)
Dept: DIABETES SERVICES | Facility: HOSPITAL | Age: 50
End: 2025-01-31
Payer: MEDICAID

## 2025-01-31 VITALS
OXYGEN SATURATION: 94 % | SYSTOLIC BLOOD PRESSURE: 107 MMHG | HEIGHT: 67 IN | TEMPERATURE: 98 F | HEART RATE: 90 BPM | WEIGHT: 216 LBS | BODY MASS INDEX: 33.9 KG/M2 | DIASTOLIC BLOOD PRESSURE: 76 MMHG

## 2025-01-31 DIAGNOSIS — Z96.41 INSULIN PUMP IN PLACE: ICD-10-CM

## 2025-01-31 DIAGNOSIS — E11.65 UNCONTROLLED TYPE 2 DIABETES MELLITUS WITH HYPERGLYCEMIA: Primary | ICD-10-CM

## 2025-01-31 DIAGNOSIS — Z79.4 TYPE 2 DIABETES MELLITUS WITH STAGE 2 CHRONIC KIDNEY DISEASE, WITH LONG-TERM CURRENT USE OF INSULIN: ICD-10-CM

## 2025-01-31 DIAGNOSIS — Z79.4 TYPE 2 DIABETES MELLITUS WITH DIABETIC NEUROPATHY, WITH LONG-TERM CURRENT USE OF INSULIN: ICD-10-CM

## 2025-01-31 DIAGNOSIS — N18.2 TYPE 2 DIABETES MELLITUS WITH STAGE 2 CHRONIC KIDNEY DISEASE, WITH LONG-TERM CURRENT USE OF INSULIN: ICD-10-CM

## 2025-01-31 DIAGNOSIS — E11.40 TYPE 2 DIABETES MELLITUS WITH DIABETIC NEUROPATHY, WITH LONG-TERM CURRENT USE OF INSULIN: ICD-10-CM

## 2025-01-31 DIAGNOSIS — E11.22 TYPE 2 DIABETES MELLITUS WITH STAGE 2 CHRONIC KIDNEY DISEASE, WITH LONG-TERM CURRENT USE OF INSULIN: ICD-10-CM

## 2025-01-31 DIAGNOSIS — Z97.8 USES SELF-APPLIED CONTINUOUS GLUCOSE MONITORING DEVICE: ICD-10-CM

## 2025-01-31 LAB
EXPIRATION DATE: ABNORMAL
GLUCOSE BLDC GLUCOMTR-MCNC: 133 MG/DL (ref 70–99)
HBA1C MFR BLD: 7.2 % (ref 4.5–5.7)
Lab: ABNORMAL

## 2025-01-31 PROCEDURE — 3078F DIAST BP <80 MM HG: CPT | Performed by: NURSE PRACTITIONER

## 2025-01-31 PROCEDURE — 3051F HG A1C>EQUAL 7.0%<8.0%: CPT | Performed by: NURSE PRACTITIONER

## 2025-01-31 PROCEDURE — 1160F RVW MEDS BY RX/DR IN RCRD: CPT | Performed by: NURSE PRACTITIONER

## 2025-01-31 PROCEDURE — 95251 CONT GLUC MNTR ANALYSIS I&R: CPT | Performed by: NURSE PRACTITIONER

## 2025-01-31 PROCEDURE — 82948 REAGENT STRIP/BLOOD GLUCOSE: CPT | Performed by: NURSE PRACTITIONER

## 2025-01-31 PROCEDURE — 3074F SYST BP LT 130 MM HG: CPT | Performed by: NURSE PRACTITIONER

## 2025-01-31 PROCEDURE — G0463 HOSPITAL OUTPT CLINIC VISIT: HCPCS | Performed by: NURSE PRACTITIONER

## 2025-01-31 PROCEDURE — 83036 HEMOGLOBIN GLYCOSYLATED A1C: CPT | Performed by: NURSE PRACTITIONER

## 2025-01-31 PROCEDURE — 99213 OFFICE O/P EST LOW 20 MIN: CPT | Performed by: NURSE PRACTITIONER

## 2025-01-31 PROCEDURE — 1159F MED LIST DOCD IN RCRD: CPT | Performed by: NURSE PRACTITIONER

## 2025-02-02 ENCOUNTER — HOSPITAL ENCOUNTER (OUTPATIENT)
Dept: MRI IMAGING | Facility: HOSPITAL | Age: 50
Discharge: HOME | End: 2025-02-02
Admitting: NURSE PRACTITIONER
Payer: MEDICAID

## 2025-02-02 DIAGNOSIS — R90.89 ABNORMAL BRAIN MRI: ICD-10-CM

## 2025-02-02 PROCEDURE — 70553 MRI BRAIN STEM W/O & W/DYE: CPT

## 2025-02-02 PROCEDURE — A9577 INJ MULTIHANCE: HCPCS | Performed by: NURSE PRACTITIONER

## 2025-02-02 PROCEDURE — 25510000002 GADOBENATE DIMEGLUMINE 529 MG/ML SOLUTION: Performed by: NURSE PRACTITIONER

## 2025-02-02 RX ADMIN — GADOBENATE DIMEGLUMINE 20 ML: 529 INJECTION, SOLUTION INTRAVENOUS at 09:55

## 2025-02-10 DIAGNOSIS — F39 MOOD DISORDER: ICD-10-CM

## 2025-02-10 RX ORDER — ARIPIPRAZOLE 5 MG/1
5 TABLET ORAL DAILY
Qty: 30 TABLET | Refills: 2 | Status: SHIPPED | OUTPATIENT
Start: 2025-02-10

## 2025-02-10 NOTE — TELEPHONE ENCOUNTER
NEXT VISIT WITH PROVIDER   Appointment with Marleny Montague APRN (02/25/2025)     LAST SEEN BY PROVIDER   Office Visit with Marleny Montague APRN (12/17/2024)     LAST MED REFILL  ARIPiprazole (ABILIFY) 5 MG tablet (10/30/2024)     PROVIDER PLEASE ADVISE
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
## 2025-02-25 ENCOUNTER — OFFICE VISIT (OUTPATIENT)
Dept: PSYCHIATRY | Facility: CLINIC | Age: 50
End: 2025-02-25
Payer: MEDICAID

## 2025-02-25 VITALS
BODY MASS INDEX: 33.83 KG/M2 | HEART RATE: 83 BPM | OXYGEN SATURATION: 99 % | SYSTOLIC BLOOD PRESSURE: 110 MMHG | DIASTOLIC BLOOD PRESSURE: 77 MMHG | HEIGHT: 67 IN

## 2025-02-25 DIAGNOSIS — F39 MOOD DISORDER: ICD-10-CM

## 2025-02-25 DIAGNOSIS — F43.10 POST TRAUMATIC STRESS DISORDER (PTSD): Primary | ICD-10-CM

## 2025-02-25 DIAGNOSIS — F41.1 GENERALIZED ANXIETY DISORDER: ICD-10-CM

## 2025-02-25 DIAGNOSIS — F51.4 NIGHT TERRORS: ICD-10-CM

## 2025-02-25 DIAGNOSIS — F44.5 PSYCHOGENIC NONEPILEPTIC SEIZURE: ICD-10-CM

## 2025-02-25 DIAGNOSIS — F60.3 BORDERLINE PERSONALITY DISORDER IN ADULT: ICD-10-CM

## 2025-02-25 RX ORDER — PRAZOSIN HYDROCHLORIDE 1 MG/1
1 CAPSULE ORAL NIGHTLY
Qty: 90 CAPSULE | Refills: 1 | Status: SHIPPED | OUTPATIENT
Start: 2025-02-25 | End: 2025-08-24

## 2025-02-25 RX ORDER — DESVENLAFAXINE 50 MG/1
TABLET, EXTENDED RELEASE ORAL
COMMUNITY
Start: 2025-02-04 | End: 2025-02-25 | Stop reason: SDUPTHER

## 2025-02-25 RX ORDER — ARIPIPRAZOLE 5 MG/1
5 TABLET ORAL DAILY
Qty: 90 TABLET | Refills: 1 | Status: SHIPPED | OUTPATIENT
Start: 2025-02-25 | End: 2025-08-24

## 2025-02-25 RX ORDER — PROGESTERONE 200 MG/1
CAPSULE ORAL
COMMUNITY
Start: 2025-02-05 | End: 2025-02-25

## 2025-02-25 RX ORDER — DESVENLAFAXINE 50 MG/1
50 TABLET, FILM COATED, EXTENDED RELEASE ORAL DAILY
Qty: 90 TABLET | Refills: 1 | Status: SHIPPED | OUTPATIENT
Start: 2025-02-25

## (undated) DEVICE — NDL HYPO ECLPS SFTY 22G 1IN

## (undated) DEVICE — VAGINAL PREP TRAY: Brand: MEDLINE INDUSTRIES, INC.

## (undated) DEVICE — DISPOSABLE TOURNIQUET CUFF SINGLE BLADDER, SINGLE PORT AND QUICK CONNECT CONNECTOR: Brand: COLOR CUFF

## (undated) DEVICE — GLV SURG BIOGEL LTX PF 8 1/2

## (undated) DEVICE — EXTREMITY-LF: Brand: MEDLINE INDUSTRIES, INC.

## (undated) DEVICE — DRSNG WND GZ CURAD OIL EMULSION 3X3IN STRL

## (undated) DEVICE — GLOVE,SURG,SENSICARE SLT,LF,PF,7: Brand: MEDLINE

## (undated) DEVICE — BNDG ESMARK 4IN 12FT LF STRL BLU

## (undated) DEVICE — SUT ETHLN 4/0 FS2 18IN 662H

## (undated) DEVICE — GAUZE,SPONGE,4"X4",16PLY,STRL,LF,10/TRAY: Brand: MEDLINE

## (undated) DEVICE — UNDERCAST PADDING: Brand: DEROYAL

## (undated) DEVICE — BNDG ELAS ECON W/CLIP 4IN 5YD LF STRL

## (undated) DEVICE — GLOVE,SURG,SENSICARE SLT,LF,PF,8.5: Brand: MEDLINE

## (undated) DEVICE — INTENDED FOR TISSUE SEPARATION, AND OTHER PROCEDURES THAT REQUIRE A SHARP SURGICAL BLADE TO PUNCTURE OR CUT.: Brand: BARD-PARKER ® CARBON RIB-BACK BLADES

## (undated) DEVICE — STERILE POLYISOPRENE POWDER-FREE SURGICAL GLOVES WITH EMOLLIENT COATING: Brand: PROTEXIS